# Patient Record
Sex: FEMALE | Race: WHITE | NOT HISPANIC OR LATINO | Employment: FULL TIME | URBAN - METROPOLITAN AREA
[De-identification: names, ages, dates, MRNs, and addresses within clinical notes are randomized per-mention and may not be internally consistent; named-entity substitution may affect disease eponyms.]

---

## 2019-04-26 ENCOUNTER — TRANSCRIBE ORDERS (OUTPATIENT)
Dept: ADMINISTRATIVE | Facility: HOSPITAL | Age: 49
End: 2019-04-26

## 2019-04-26 ENCOUNTER — APPOINTMENT (OUTPATIENT)
Dept: LAB | Facility: IMAGING CENTER | Age: 49
End: 2019-04-26
Payer: COMMERCIAL

## 2019-04-26 DIAGNOSIS — Z00.01 ENCOUNTER FOR GENERAL ADULT MEDICAL EXAMINATION WITH ABNORMAL FINDINGS: ICD-10-CM

## 2019-04-26 DIAGNOSIS — Z00.01 ENCOUNTER FOR GENERAL ADULT MEDICAL EXAMINATION WITH ABNORMAL FINDINGS: Primary | ICD-10-CM

## 2019-04-26 LAB
25(OH)D3 SERPL-MCNC: 28.3 NG/ML (ref 30–100)
ALBUMIN SERPL BCP-MCNC: 3.8 G/DL (ref 3.5–5)
ALP SERPL-CCNC: 82 U/L (ref 46–116)
ALT SERPL W P-5'-P-CCNC: 22 U/L (ref 12–78)
ANION GAP SERPL CALCULATED.3IONS-SCNC: 5 MMOL/L (ref 4–13)
AST SERPL W P-5'-P-CCNC: 14 U/L (ref 5–45)
BASOPHILS # BLD AUTO: 0.05 THOUSANDS/ΜL (ref 0–0.1)
BASOPHILS NFR BLD AUTO: 1 % (ref 0–1)
BILIRUB SERPL-MCNC: 0.44 MG/DL (ref 0.2–1)
BUN SERPL-MCNC: 10 MG/DL (ref 5–25)
CALCIUM SERPL-MCNC: 8.9 MG/DL (ref 8.3–10.1)
CHLORIDE SERPL-SCNC: 106 MMOL/L (ref 100–108)
CHOLEST SERPL-MCNC: 127 MG/DL (ref 50–200)
CO2 SERPL-SCNC: 27 MMOL/L (ref 21–32)
CREAT SERPL-MCNC: 0.76 MG/DL (ref 0.6–1.3)
EOSINOPHIL # BLD AUTO: 0.17 THOUSAND/ΜL (ref 0–0.61)
EOSINOPHIL NFR BLD AUTO: 2 % (ref 0–6)
ERYTHROCYTE [DISTWIDTH] IN BLOOD BY AUTOMATED COUNT: 12.1 % (ref 11.6–15.1)
GFR SERPL CREATININE-BSD FRML MDRD: 92 ML/MIN/1.73SQ M
GLUCOSE P FAST SERPL-MCNC: 63 MG/DL (ref 65–99)
HCT VFR BLD AUTO: 42.5 % (ref 34.8–46.1)
HDLC SERPL-MCNC: 45 MG/DL (ref 40–60)
HGB BLD-MCNC: 13.8 G/DL (ref 11.5–15.4)
IMM GRANULOCYTES # BLD AUTO: 0.02 THOUSAND/UL (ref 0–0.2)
IMM GRANULOCYTES NFR BLD AUTO: 0 % (ref 0–2)
LDLC SERPL CALC-MCNC: 58 MG/DL (ref 0–100)
LYMPHOCYTES # BLD AUTO: 1.36 THOUSANDS/ΜL (ref 0.6–4.47)
LYMPHOCYTES NFR BLD AUTO: 20 % (ref 14–44)
MCH RBC QN AUTO: 30.2 PG (ref 26.8–34.3)
MCHC RBC AUTO-ENTMCNC: 32.5 G/DL (ref 31.4–37.4)
MCV RBC AUTO: 93 FL (ref 82–98)
MONOCYTES # BLD AUTO: 0.45 THOUSAND/ΜL (ref 0.17–1.22)
MONOCYTES NFR BLD AUTO: 6 % (ref 4–12)
NEUTROPHILS # BLD AUTO: 4.93 THOUSANDS/ΜL (ref 1.85–7.62)
NEUTS SEG NFR BLD AUTO: 71 % (ref 43–75)
NONHDLC SERPL-MCNC: 82 MG/DL
NRBC BLD AUTO-RTO: 0 /100 WBCS
PLATELET # BLD AUTO: 311 THOUSANDS/UL (ref 149–390)
PMV BLD AUTO: 11.1 FL (ref 8.9–12.7)
POTASSIUM SERPL-SCNC: 4.3 MMOL/L (ref 3.5–5.3)
PROT SERPL-MCNC: 7.3 G/DL (ref 6.4–8.2)
RBC # BLD AUTO: 4.57 MILLION/UL (ref 3.81–5.12)
SODIUM SERPL-SCNC: 138 MMOL/L (ref 136–145)
TRIGL SERPL-MCNC: 119 MG/DL
TSH SERPL DL<=0.05 MIU/L-ACNC: 0.83 UIU/ML (ref 0.36–3.74)
WBC # BLD AUTO: 6.98 THOUSAND/UL (ref 4.31–10.16)

## 2019-04-26 PROCEDURE — 84443 ASSAY THYROID STIM HORMONE: CPT

## 2019-04-26 PROCEDURE — 82306 VITAMIN D 25 HYDROXY: CPT

## 2019-04-26 PROCEDURE — 85025 COMPLETE CBC W/AUTO DIFF WBC: CPT

## 2019-04-26 PROCEDURE — 36415 COLL VENOUS BLD VENIPUNCTURE: CPT

## 2019-04-26 PROCEDURE — 80061 LIPID PANEL: CPT

## 2019-04-26 PROCEDURE — 80053 COMPREHEN METABOLIC PANEL: CPT

## 2020-05-27 ENCOUNTER — OFFICE VISIT (OUTPATIENT)
Dept: URGENT CARE | Age: 50
End: 2020-05-27
Payer: COMMERCIAL

## 2020-05-27 ENCOUNTER — TRANSCRIBE ORDERS (OUTPATIENT)
Dept: URGENT CARE | Age: 50
End: 2020-05-27

## 2020-05-27 ENCOUNTER — APPOINTMENT (OUTPATIENT)
Dept: RADIOLOGY | Age: 50
End: 2020-05-27
Payer: COMMERCIAL

## 2020-05-27 VITALS
RESPIRATION RATE: 18 BRPM | SYSTOLIC BLOOD PRESSURE: 119 MMHG | OXYGEN SATURATION: 98 % | HEIGHT: 60 IN | HEART RATE: 76 BPM | TEMPERATURE: 98.6 F | BODY MASS INDEX: 39.89 KG/M2 | WEIGHT: 203.2 LBS | DIASTOLIC BLOOD PRESSURE: 83 MMHG

## 2020-05-27 DIAGNOSIS — S60.222A CONTUSION OF LEFT HAND, INITIAL ENCOUNTER: Primary | ICD-10-CM

## 2020-05-27 DIAGNOSIS — M79.642 PAIN OF LEFT HAND: Primary | ICD-10-CM

## 2020-05-27 DIAGNOSIS — M79.642 PAIN OF LEFT HAND: ICD-10-CM

## 2020-05-27 PROCEDURE — 73130 X-RAY EXAM OF HAND: CPT

## 2020-05-27 PROCEDURE — 99213 OFFICE O/P EST LOW 20 MIN: CPT | Performed by: NURSE PRACTITIONER

## 2020-05-27 RX ORDER — MONTELUKAST SODIUM 10 MG/1
10 TABLET ORAL
COMMUNITY

## 2020-05-27 RX ORDER — LORATADINE 10 MG/1
10 TABLET ORAL DAILY
COMMUNITY

## 2021-04-03 ENCOUNTER — OFFICE VISIT (OUTPATIENT)
Dept: URGENT CARE | Age: 51
End: 2021-04-03
Payer: COMMERCIAL

## 2021-04-03 VITALS
WEIGHT: 193 LBS | DIASTOLIC BLOOD PRESSURE: 80 MMHG | TEMPERATURE: 96.5 F | RESPIRATION RATE: 18 BRPM | HEIGHT: 60 IN | SYSTOLIC BLOOD PRESSURE: 104 MMHG | BODY MASS INDEX: 37.89 KG/M2 | OXYGEN SATURATION: 98 % | HEART RATE: 89 BPM

## 2021-04-03 DIAGNOSIS — R05.9 COUGH: Primary | ICD-10-CM

## 2021-04-03 PROCEDURE — 99213 OFFICE O/P EST LOW 20 MIN: CPT | Performed by: NURSE PRACTITIONER

## 2021-04-03 PROCEDURE — 87635 SARS-COV-2 COVID-19 AMP PRB: CPT | Performed by: NURSE PRACTITIONER

## 2021-04-03 NOTE — PATIENT INSTRUCTIONS
Patient Instructions     COVID testing initiated  Results may take up to 5-10 days to return, but often come back sooner (2-4 days)     If the patient has a [facility] My Chart account, results may be accessed on line  If the patient does not have the [facility] Chart account, please establish one so results can be accessed  This will be the easiest and quickest way to get a copy of your test results if you require printed documentation  If patient is symptomatic and until results are obtained, home quarantine / self isolation strongly encouraged  If testing is done for screening purposes and patient is not symptomatic, we still recommend masking, social distancing, good hygiene practices be followed  If COVID test is positive, patient / care giver will be contacted by ordering provider or designated staff  If COVID test is positive, please call the primary care provider office to inform of positive test and request follow up evaluation appointment  (Generally, primary care providers are doing telemedicine visits with their positive COVID patients )  If COVID test is positive, please again review all information below  Further questions may be addressed by the primary care provider or the [address] [place] [address] [employer] at [phone]  If the patient / caregiver has not heard about test results or has been unable to access results on the patient My Chart account in a timely fashion, please call the provider's office where test was ordered (or Hot Line if applicable)  to inquire about results  If results are negative and patient / care giver has been found to have already accessed results through the [facility], no call will be made  Until results are obtained, home quarantine / self isolation strongly encouraged       If the patient would develop profound weakness, chest pain, shortness of breath please proceed to an emergency room for further evaluation otherwise we do recommend that patient follow-up with their primary care provider in the next 5-7 days if not improving  Symptomatic treatment as needed for symptoms relief based on age / medical status of patient  Things like warm salt water gargles, Tylenol or Ibuprofen (if not contraindicated), drinking plenty of fluids, nasal saline rinses / spray, warm tea with honey (not for patients less than 1 year of age),  etc may provide symptoms relief  [address]    Your healthcare provider and/or public health staff have evaluated you and have determined that you do not need to remain in the hospital at this time  At this time you can be isolated at home where you will be monitored by staff from your local or state health department  You should carefully follow the prevention and isolation steps below until a healthcare provider or local or state health department says that you can return to your normal activities  Stay home except to get medical care    People who are mildly ill with COVID-19 are able to isolate at home during their illness  You should restrict activities outside your home, except for getting medical care  Do not go to work, school, or public areas  Avoid using public transportation, ride-sharing, or taxis  Separate yourself from other people and animals in your home    People: As much as possible, you should stay in a specific room and away from other people in your home  Also, you should use a separate bathroom, if available  Animals: You should restrict contact with pets and other animals while you are sick with COVID-19, just like you would around other people  Although there have not been reports of pets or other animals becoming sick with COVID-19, it is still recommended that people sick with COVID-19 limit contact with animals until more information is known about the virus  When possible, have another member of your household care for your animals while you are sick   If you are sick with COVID-19, avoid contact with your pet, including petting, snuggling, being kissed or licked, and sharing food  If you must care for your pet or be around animals while you are sick, wash your hands before and after you interact with pets and wear a facemask  See COVID-19 and Animals for more information  Call ahead before visiting your doctor    If you have a medical appointment, call the healthcare provider and tell them that you have or may have COVID-19  This will help the healthcare providers office take steps to keep other people from getting infected or exposed  Wear a facemask    You should wear a facemask when you are around other people (e g , sharing a room or vehicle) or pets and before you enter a healthcare providers office  If you are not able to wear a facemask (for example, because it causes trouble breathing), then people who live with you should not stay in the same room with you, or they should wear a facemask if they enter your room  Cover your coughs and sneezes    Cover your mouth and nose with a tissue when you cough or sneeze  Throw used tissues in a lined trash can  Immediately wash your hands with soap and water for at least 20 seconds or, if soap and water are not available, clean your hands with an alcohol-based hand  that contains at least 60% alcohol  Clean your hands often    Wash your hands often with soap and water for at least 20 seconds, especially after blowing your nose, coughing, or sneezing; going to the bathroom; and before eating or preparing food  If soap and water are not readily available, use an alcohol-based hand  with at least 60% alcohol, covering all surfaces of your hands and rubbing them together until they feel dry  Soap and water are the best option if hands are visibly dirty  Avoid touching your eyes, nose, and mouth with unwashed hands      Avoid sharing personal household items    You should not share dishes, drinking glasses, cups, eating utensils, towels, or bedding with other people or pets in your home  After using these items, they should be washed thoroughly with soap and water  Clean all high-touch surfaces everyday    High touch surfaces include counters, tabletops, doorknobs, bathroom fixtures, toilets, phones, keyboards, tablets, and bedside tables  Also, clean any surfaces that may have blood, stool, or body fluids on them  Use a household cleaning spray or wipe, according to the label instructions  Labels contain instructions for safe and effective use of the cleaning product including precautions you should take when applying the product, such as wearing gloves and making sure you have good ventilation during use of the product  Monitor your symptoms    Seek prompt medical attention if your illness is worsening (e g , difficulty breathing)  Before seeking care, call your healthcare provider and tell them that you have, or are being evaluated for, COVID-19  Put on a facemask before you enter the facility  These steps will help the healthcare providers office to keep other people in the office or waiting room from getting infected or exposed  Ask your healthcare provider to call the local or [facility] health department  Persons who are placed under active monitoring or facilitated self-monitoring should follow instructions provided by their local health department or occupational health professionals, as appropriate  If you have a medical emergency and need to call 911, notify the dispatch personnel that you have, or are being evaluated for COVID-19  If possible, put on a facemask before emergency medical services arrive      Discontinuing home isolation    Patients with confirmed COVID-19 should remain under home isolation precautions until the following conditions are met:   - They have had no fever for at least 24 hours (that is one full day of no fever without the use medicine that reduces fevers)  AND  - other symptoms have improved (for example, when their cough or shortness of breath have improved)  AND  - If had mild or moderate illness, at least 10 days have passed since their symptoms first appeared or if severe illness (needed oxygen) or immunosuppressed, at least 20 days have passed since symptoms first appeared  Patients with confirmed COVID-19 should also notify close contacts (including their workplace) and ask that they self-quarantine  Currently, close contact is defined as being within 6 feet for 15 minutes or more from the period 24 hours starting 48 hours before symptom onset to the time at which the patient went into isolation  Close contacts of patients diagnosed with COVID-19 should be instructed by the patient to self-quarantine for 14 days from the last time of their last contact with the patient       Source: [employer]

## 2021-04-03 NOTE — PROGRESS NOTES
[facility]        NAME: Candis Angel is a 44 y o  female  : 1970    MRN: [id number]  DATE: April 3, 2021  TIME: 3:46 PM    Assessment and Plan   Cough [R05]  1  Cough  Novel Coronavirus (Covid-19),PCR [facility] - Office Collection     covid swab done   Patient Instructions     COVID testing initiated  Results may take up to 5-10 days to return, but often come back sooner (2-4 days)     If the patient has a [facility] My Chart account, results may be accessed on line  If the patient does not have the [facility] Chart account, please establish one so results can be accessed  This will be the easiest and quickest way to get a copy of your test results if you require printed documentation  If patient is symptomatic and until results are obtained, home quarantine / self isolation strongly encouraged  If testing is done for screening purposes and patient is not symptomatic, we still recommend masking, social distancing, good hygiene practices be followed  If COVID test is positive, patient / care giver will be contacted by ordering provider or designated staff  If COVID test is positive, please call the primary care provider office to inform of positive test and request follow up evaluation appointment  (Generally, primary care providers are doing telemedicine visits with their positive COVID patients )  If COVID test is positive, please again review all information below  Further questions may be addressed by the primary care provider or the [address] [place] [address] [employer] at [phone]  If the patient / caregiver has not heard about test results or has been unable to access results on the patient My Chart account in a timely fashion, please call the provider's office where test was ordered (or Hot Line if applicable)  to inquire about results         If results are negative and patient / care giver has been found to have already accessed results through the [facility] g[facility], no call will be made  Until results are obtained, home quarantine / self isolation strongly encouraged  If the patient would develop profound weakness, chest pain, shortness of breath please proceed to an emergency room for further evaluation otherwise we do recommend that patient follow-up with their primary care provider in the next 5-7 days if not improving  Symptomatic treatment as needed for symptoms relief based on age / medical status of patient  Things like warm salt water gargles, Tylenol or Ibuprofen (if not contraindicated), drinking plenty of fluids, nasal saline rinses / spray, warm tea with honey (not for patients less than 1 year of age),  etc may provide symptoms relief  Patient Instructions     Follow up with PCP in 3-5 days  Proceed to  ER if symptoms worsen  Chief Complaint     Chief Complaint   Patient presents with    Headache     Patient c/o congestion, sore throat, headache, and cough x 5 day  Patient reports that a co-worker/ manager  tested positive for covid-19  History of Present Illness   David Epifanioit presents to the clinic c/o    Patient c/o congestion, sore throat, headache, and cough x 3 day  Patient reports that a co-worker/ manager tested positive for covid-19  Headache is the most bothersome symptoms   Taking ibuprofen   Has been taking amoxicillin also - no improvement - thought it was a sinus infection initially  Now that she isn't getting better she would like a covid swab  Has np appt with pcp scheduled in 2 weeks  Review of Systems   Review of Systems   All other systems reviewed and are negative          Current Medications     Long-Term Medications   Medication Sig Dispense Refill    hyoscyamine (LEVSIN/SL) 0 125 mg SL tablet Take 0 125 mg by mouth every 4 (four) hours as needed for cramping      loratadine (CLARITIN) 10 mg tablet Take 10 mg by mouth daily      montelukast (SINGULAIR) 10 mg tablet Take 10 mg by mouth daily at bedtime         Current Allergies     Allergies as of 04/03/2021 - Reviewed 04/03/2021   Allergen Reaction Noted    Codeine GI Intolerance and Other (See Comments) 07/14/2014    Hydrocodone-acetaminophen Other (See Comments) and Headache 11/05/2015            The following portions of the patient's history were reviewed and updated as appropriate: allergies, current medications, past family history, past medical history, past social history, past surgical history and problem list     Objective   /80   Pulse 89   Temp (!) 96 5 °F (35 8 °C)   Resp 18   Ht 5' (1 524 m)   Wt 87 5 kg (193 lb)   SpO2 98%   BMI 37 69 kg/m²        Physical Exam     Physical Exam  Vitals signs and nursing note reviewed  Constitutional:       Appearance: She is well-developed  HENT:      Head: Normocephalic and atraumatic  Eyes:      General: Lids are normal       Conjunctiva/sclera: Conjunctivae normal    Cardiovascular:      Rate and Rhythm: Normal rate and regular rhythm  Heart sounds: Normal heart sounds, S1 normal and S2 normal    Pulmonary:      Effort: Pulmonary effort is normal       Breath sounds: Normal breath sounds  Skin:     General: Skin is warm and dry  Neurological:      Mental Status: She is alert and oriented to person, place, and time  Psychiatric:         Speech: Speech normal          Behavior: Behavior normal  Behavior is cooperative  Thought Content:  Thought content normal          Judgment: Judgment normal

## 2021-04-04 LAB — SARS-COV-2 RNA RESP QL NAA+PROBE: NEGATIVE

## 2021-04-11 ENCOUNTER — OFFICE VISIT (OUTPATIENT)
Dept: URGENT CARE | Age: 51
End: 2021-04-11
Payer: COMMERCIAL

## 2021-04-11 VITALS
BODY MASS INDEX: 37.89 KG/M2 | SYSTOLIC BLOOD PRESSURE: 119 MMHG | TEMPERATURE: 98.2 F | RESPIRATION RATE: 18 BRPM | DIASTOLIC BLOOD PRESSURE: 86 MMHG | WEIGHT: 193 LBS | OXYGEN SATURATION: 99 % | HEART RATE: 94 BPM | HEIGHT: 60 IN

## 2021-04-11 DIAGNOSIS — N30.01 ACUTE CYSTITIS WITH HEMATURIA: Primary | ICD-10-CM

## 2021-04-11 LAB
SL AMB  POCT GLUCOSE, UA: NEGATIVE
SL AMB LEUKOCYTE ESTERASE,UA: ABNORMAL
SL AMB POCT BILIRUBIN,UA: NEGATIVE
SL AMB POCT BLOOD,UA: ABNORMAL
SL AMB POCT CLARITY,UA: ABNORMAL
SL AMB POCT COLOR,UA: ABNORMAL
SL AMB POCT KETONES,UA: NEGATIVE
SL AMB POCT NITRITE,UA: NEGATIVE
SL AMB POCT PH,UA: 5
SL AMB POCT SPECIFIC GRAVITY,UA: 1
SL AMB POCT URINE PROTEIN: ABNORMAL
SL AMB POCT UROBILINOGEN: 0.2

## 2021-04-11 PROCEDURE — 87186 SC STD MICRODIL/AGAR DIL: CPT | Performed by: PHYSICIAN ASSISTANT

## 2021-04-11 PROCEDURE — 87077 CULTURE AEROBIC IDENTIFY: CPT | Performed by: PHYSICIAN ASSISTANT

## 2021-04-11 PROCEDURE — 87086 URINE CULTURE/COLONY COUNT: CPT | Performed by: PHYSICIAN ASSISTANT

## 2021-04-11 PROCEDURE — 81002 URINALYSIS NONAUTO W/O SCOPE: CPT | Performed by: PHYSICIAN ASSISTANT

## 2021-04-11 PROCEDURE — 99213 OFFICE O/P EST LOW 20 MIN: CPT | Performed by: PHYSICIAN ASSISTANT

## 2021-04-11 RX ORDER — PHENAZOPYRIDINE HYDROCHLORIDE 200 MG/1
200 TABLET, FILM COATED ORAL
Qty: 10 TABLET | Refills: 0 | Status: SHIPPED | OUTPATIENT
Start: 2021-04-11

## 2021-04-11 RX ORDER — CEPHALEXIN 500 MG/1
500 CAPSULE ORAL EVERY 12 HOURS SCHEDULED
Qty: 14 CAPSULE | Refills: 0 | Status: SHIPPED | OUTPATIENT
Start: 2021-04-11 | End: 2021-04-11

## 2021-04-11 RX ORDER — CEPHALEXIN 500 MG/1
500 CAPSULE ORAL EVERY 12 HOURS SCHEDULED
Qty: 14 CAPSULE | Refills: 0 | Status: SHIPPED | OUTPATIENT
Start: 2021-04-11 | End: 2021-04-18

## 2021-04-11 NOTE — PROGRESS NOTES
[facility]        NAME: Robert Mccormack is a 44 y o  female  : 1970    MRN: [id number]  DATE: 2021  TIME: 7:51 PM    Assessment and Plan   Acute cystitis with hematuria [N30 01]  1  Acute cystitis with hematuria  POCT urine dip    Urine culture    cephalexin (KEFLEX) 500 mg capsule         Patient Instructions       Follow up with PCP in 3-5 days  Proceed to  ER if symptoms worsen  Chief Complaint     Chief Complaint   Patient presents with    Possible UTI     pt reports urinary frequency and urinary burning and blood when wiping that started yesterday         History of Present Illness       Patient is c/o urinary frequency and dysuria  Pt reports symptoms began yesterday  Burning w/ urination  Also hematuria w/ wiping  Pt denies fever  Urinary Tract Infection   This is a new problem  The current episode started yesterday  The problem has been gradually worsening  The pain is mild  There has been no fever  Associated symptoms include frequency  Pertinent negatives include no chills, hematuria or vomiting  Review of Systems   Review of Systems   Constitutional: Negative for chills and fever  HENT: Negative for ear pain and sore throat  Eyes: Negative for pain and visual disturbance  Respiratory: Negative for cough and shortness of breath  Cardiovascular: Negative for chest pain and palpitations  Gastrointestinal: Negative for abdominal pain and vomiting  Genitourinary: Positive for dysuria and frequency  Negative for hematuria  Musculoskeletal: Negative for arthralgias and back pain  Skin: Negative for color change and rash  Neurological: Negative for seizures and syncope  All other systems reviewed and are negative          Current Medications       Current Outpatient Medications:     hyoscyamine (LEVSIN/SL) 0 125 mg SL tablet, Take 0 125 mg by mouth every 4 (four) hours as needed for cramping, Disp: , Rfl:     loratadine (CLARITIN) 10 mg tablet, Take 10 mg by mouth daily, Disp: , Rfl:     montelukast (SINGULAIR) 10 mg tablet, Take 10 mg by mouth daily at bedtime, Disp: , Rfl:     cephalexin (KEFLEX) 500 mg capsule, Take 1 capsule (500 mg total) by mouth every 12 (twelve) hours for 7 days, Disp: 14 capsule, Rfl: 0    Current Allergies     Allergies as of 04/11/2021 - Reviewed 04/11/2021   Allergen Reaction Noted    Codeine GI Intolerance and Other (See Comments) 07/14/2014    Hydrocodone-acetaminophen Other (See Comments) and Headache 11/05/2015            The following portions of the patient's history were reviewed and updated as appropriate: allergies, current medications, past family history, past medical history, past social history, past surgical history and problem list      Past Medical History:   Diagnosis Date    GI symptoms 05/27/2020    Lactose intolerance        Past Surgical History:   Procedure Laterality Date    OVARIAN CYST REMOVAL Left        History reviewed  No pertinent family history  Medications have been verified  Objective   /86   Pulse 94   Temp 98 2 °F (36 8 °C)   Resp 18   Ht 5' (1 524 m)   Wt 87 5 kg (193 lb)   SpO2 99%   BMI 37 69 kg/m²   No LMP recorded  Physical Exam     Physical Exam  Constitutional:       Appearance: Normal appearance  HENT:      Head: Normocephalic and atraumatic  Nose: Nose normal       Mouth/Throat:      Mouth: Mucous membranes are moist    Eyes:      Extraocular Movements: Extraocular movements intact  Conjunctiva/sclera: Conjunctivae normal       Pupils: Pupils are equal, round, and reactive to light  Neck:      Musculoskeletal: Normal range of motion and neck supple  Cardiovascular:      Rate and Rhythm: Normal rate  Pulmonary:      Effort: Pulmonary effort is normal    Musculoskeletal: Normal range of motion  Skin:     General: Skin is warm and dry  Capillary Refill: Capillary refill takes less than 2 seconds     Neurological:      General: No focal deficit present  Mental Status: She is alert and oriented to person, place, and time     Psychiatric:         Mood and Affect: Mood normal          Behavior: Behavior normal

## 2021-04-11 NOTE — PATIENT INSTRUCTIONS
Urinary Tract Infection in Women   WHAT YOU NEED TO KNOW:   A urinary tract infection (UTI) is caused by bacteria that get inside your urinary tract  Most bacteria that enter your urinary tract come out when you urinate  If the bacteria stay in your urinary tract, you may get an infection  Your urinary tract includes your kidneys, ureters, bladder, and urethra  Urine is made in your kidneys, and it flows from the ureters to the bladder  Urine leaves the bladder through the urethra  A UTI is more common in your lower urinary tract, which includes your bladder and urethra  DISCHARGE INSTRUCTIONS:   Return to the emergency department if:   · You are urinating very little or not at all  · You have a high fever with shaking chills  · You have side or back pain that gets worse  Call your doctor if:   · You have a fever  · You do not feel better after 2 days of taking antibiotics  · You are vomiting  · You have questions or concerns about your condition or care  Medicines:   · Antibiotics  help fight a bacterial infection  If you have UTIs often (called recurrent UTIs), you may be given antibiotics to take regularly  You will be given directions for when and how to use antibiotics  The goal is to prevent UTIs but not cause antibiotic resistance by using antibiotics too often  · Medicines  may be given to decrease pain and burning when you urinate  They will also help decrease the feeling that you need to urinate often  These medicines will make your urine orange or red  · Take your medicine as directed  Contact your healthcare provider if you think your medicine is not helping or if you have side effects  Tell him or her if you are allergic to any medicine  Keep a list of the medicines, vitamins, and herbs you take  Include the amounts, and when and why you take them  Bring the list or the pill bottles to follow-up visits   Carry your medicine list with you in case of an emergency  Follow up with your healthcare provider as directed:  Write down your questions so you remember to ask them during your visits  Prevent another UTI:   · Empty your bladder often  Urinate and empty your bladder as soon as you feel the need  Do not hold your urine for long periods of time  · Wipe from front to back after you urinate or have a bowel movement  This will help prevent germs from getting into your urinary tract through your urethra  · Drink liquids as directed  Ask how much liquid to drink each day and which liquids are best for you  You may need to drink more liquids than usual to help flush out the bacteria  Do not drink alcohol, caffeine, or citrus juices  These can irritate your bladder and increase your symptoms  Your healthcare provider may recommend cranberry juice to help prevent a UTI  · Urinate after you have sex  This can help flush out bacteria passed during sex  · Do not douche or use feminine deodorants  These can change the chemical balance in your vagina  · Change sanitary pads or tampons often  This will help prevent germs from getting into your urinary tract  · Talk to your healthcare provider about your birth control method  You may need to change your method if it is increasing your risk for UTIs  · Wear cotton underwear and clothes that are loose  Tight pants and nylon underwear can trap moisture and cause bacteria to grow  · Vaginal estrogen may be recommended  This medicine helps prevent UTIs in women who have gone through menopause or are in maritza-menopause  · Do pelvic muscle exercises often  Pelvic muscle exercises may help you start and stop urinating  Strong pelvic muscles may help you empty your bladder easier  Squeeze these muscles tightly for 5 seconds like you are trying to hold back urine  Then relax for 5 seconds  Gradually work up to squeezing for 10 seconds  Do 3 sets of 15 repetitions a day, or as directed      © Copyright [address] Information is for [employer] use only and may not be sold, redistributed or otherwise used for commercial purposes  All illustrations and images included in CareNotes® are the copyrighted property of [employer]  or [employer]  The above information is an  only  It is not intended as medical advice for individual conditions or treatments  Talk to your doctor, nurse or pharmacist before following any medical regimen to see if it is safe and effective for you

## 2021-04-13 LAB — BACTERIA UR CULT: ABNORMAL

## 2022-05-13 ENCOUNTER — OFFICE VISIT (OUTPATIENT)
Dept: FAMILY MEDICINE CLINIC | Facility: CLINIC | Age: 52
End: 2022-05-13
Payer: COMMERCIAL

## 2022-05-13 VITALS
SYSTOLIC BLOOD PRESSURE: 134 MMHG | BODY MASS INDEX: 35.14 KG/M2 | RESPIRATION RATE: 16 BRPM | HEART RATE: 81 BPM | DIASTOLIC BLOOD PRESSURE: 86 MMHG | OXYGEN SATURATION: 98 % | WEIGHT: 179 LBS | TEMPERATURE: 97.3 F | HEIGHT: 60 IN

## 2022-05-13 DIAGNOSIS — R11.0 NAUSEA: ICD-10-CM

## 2022-05-13 DIAGNOSIS — J45.20 MILD INTERMITTENT ASTHMA WITHOUT COMPLICATION: ICD-10-CM

## 2022-05-13 DIAGNOSIS — F33.9 DEPRESSION, RECURRENT (HCC): ICD-10-CM

## 2022-05-13 DIAGNOSIS — F41.9 ANXIETY: ICD-10-CM

## 2022-05-13 DIAGNOSIS — Z00.00 HEALTHCARE MAINTENANCE: ICD-10-CM

## 2022-05-13 DIAGNOSIS — Z78.0 ASYMPTOMATIC MENOPAUSE: ICD-10-CM

## 2022-05-13 DIAGNOSIS — Z12.31 ENCOUNTER FOR SCREENING MAMMOGRAM FOR MALIGNANT NEOPLASM OF BREAST: Primary | ICD-10-CM

## 2022-05-13 PROBLEM — M48.061 SPINAL STENOSIS OF LUMBAR REGION: Status: ACTIVE | Noted: 2022-05-13

## 2022-05-13 PROBLEM — N39.3 SUI (STRESS URINARY INCONTINENCE, FEMALE): Status: ACTIVE | Noted: 2018-01-27

## 2022-05-13 PROBLEM — M25.519 SHOULDER PAIN: Status: ACTIVE | Noted: 2022-05-13

## 2022-05-13 PROBLEM — M54.2 NECK PAIN: Status: ACTIVE | Noted: 2022-05-13

## 2022-05-13 PROCEDURE — 3725F SCREEN DEPRESSION PERFORMED: CPT | Performed by: FAMILY MEDICINE

## 2022-05-13 PROCEDURE — 99386 PREV VISIT NEW AGE 40-64: CPT | Performed by: FAMILY MEDICINE

## 2022-05-13 RX ORDER — ONDANSETRON 4 MG/1
4 TABLET, ORALLY DISINTEGRATING ORAL EVERY 6 HOURS PRN
Qty: 30 TABLET | Refills: 0 | Status: SHIPPED | OUTPATIENT
Start: 2022-05-13

## 2022-05-13 RX ORDER — LORAZEPAM 0.5 MG/1
TABLET ORAL
COMMUNITY
Start: 2022-05-05 | End: 2022-05-24 | Stop reason: SDUPTHER

## 2022-05-13 RX ORDER — CYCLOBENZAPRINE HCL 10 MG
TABLET ORAL
COMMUNITY

## 2022-05-13 RX ORDER — BUPROPION HYDROCHLORIDE 150 MG/1
150 TABLET ORAL EVERY MORNING
Qty: 30 TABLET | Refills: 5 | Status: SHIPPED | OUTPATIENT
Start: 2022-05-13 | End: 2022-05-24

## 2022-05-13 RX ORDER — ONDANSETRON 4 MG/1
4 TABLET, FILM COATED ORAL DAILY
COMMUNITY
Start: 2022-05-05 | End: 2022-05-24

## 2022-05-13 RX ORDER — ALBUTEROL SULFATE 90 UG/1
AEROSOL, METERED RESPIRATORY (INHALATION)
COMMUNITY

## 2022-05-13 NOTE — PROGRESS NOTES
Assessment/Plan:    Anxiety  Wellbutrin sent to pharmacy  Will continue to monitor in one month  Asymptomatic menopause  DXA scan ordered  Will continue to monitor  Encounter for screening mammogram for malignant neoplasm of breast  Mammogram ordered  Will continue to monitor     Healthcare maintenance  Routine labs ordered  Will continue to monitor at next follow up visit in one month  Problem List Items Addressed This Visit        Respiratory    Asthma    Relevant Medications    albuterol (PROVENTIL HFA,VENTOLIN HFA) 90 mcg/act inhaler       Other    Depression, recurrent (HCC)    Relevant Medications    LORazepam (ATIVAN) 0 5 mg tablet    buPROPion (Wellbutrin XL) 150 mg 24 hr tablet    Encounter for screening mammogram for malignant neoplasm of breast - Primary     Mammogram ordered  Will continue to monitor            Relevant Orders    Mammo screening bilateral w 3d & cad    Healthcare maintenance     Routine labs ordered  Will continue to monitor at next follow up visit in one month  Relevant Orders    CBC and differential    Comprehensive metabolic panel    Lipid Panel with Direct LDL reflex    TSH, 3rd generation with Free T4 reflex    Anxiety     Wellbutrin sent to pharmacy  Will continue to monitor in one month  Relevant Medications    buPROPion (Wellbutrin XL) 150 mg 24 hr tablet    Nausea    Relevant Medications    ondansetron (Zofran ODT) 4 mg disintegrating tablet    Asymptomatic menopause     DXA scan ordered  Will continue to monitor  Relevant Orders    DXA bone density spine hip and pelvis      Other Visit Diagnoses     BMI 34 0-34 9,adult                Subjective:      Patient ID: Argelia Allred is a 46 y o  female  MR is a 46 yof presenting to the office today to establish new patient care  She has concerns about ongoing anxiety attacks that have been occurring for the past few months   She states that she often feels anxious and nauseous when these attacks happen  She has used 0 5 mg of ativan before bed with some relief  She used to be on Wellbutrin a while ago which helped her symptoms in the past  She is also starting a weight loss program soon to help with weight management  She is taking all other medications as prescribed with no side effects  The following portions of the patient's history were reviewed and updated as appropriate: allergies, current medications, past family history, past medical history, past social history, past surgical history and problem list     Review of Systems   Constitutional: Negative for chills and fever  HENT: Negative for ear pain and sore throat  Eyes: Negative for pain and visual disturbance  Respiratory: Negative for cough and shortness of breath  Cardiovascular: Negative for chest pain and palpitations  Gastrointestinal: Negative for abdominal pain and vomiting  Genitourinary: Negative for dysuria and hematuria  Musculoskeletal: Negative for arthralgias and back pain  Skin: Negative for color change and rash  Neurological: Negative for seizures and syncope  Psychiatric/Behavioral: The patient is nervous/anxious  All other systems reviewed and are negative  Objective:      /86 (BP Location: Left arm, Patient Position: Sitting, Cuff Size: Large)   Pulse 81   Temp (!) 97 3 °F (36 3 °C) (Tympanic)   Resp 16   Ht 5' (1 524 m)   Wt 81 2 kg (179 lb)   SpO2 98%   BMI 34 96 kg/m²          Physical Exam  Vitals and nursing note reviewed  Constitutional:       Appearance: Normal appearance  She is well-developed  HENT:      Head: Normocephalic and atraumatic  Right Ear: Tympanic membrane normal       Left Ear: Tympanic membrane normal       Nose: Nose normal       Mouth/Throat:      Mouth: Mucous membranes are moist    Eyes:      Conjunctiva/sclera: Conjunctivae normal       Pupils: Pupils are equal, round, and reactive to light     Cardiovascular: Rate and Rhythm: Normal rate and regular rhythm  Pulses: Normal pulses  Heart sounds: Normal heart sounds  Pulmonary:      Effort: Pulmonary effort is normal       Breath sounds: Normal breath sounds  Abdominal:      General: Bowel sounds are normal       Palpations: Abdomen is soft  Musculoskeletal:         General: Normal range of motion  Cervical back: Normal range of motion and neck supple  Right lower leg: No edema  Left lower leg: No edema  Lymphadenopathy:      Cervical: No cervical adenopathy  Skin:     General: Skin is warm and dry  Capillary Refill: Capillary refill takes less than 2 seconds  Neurological:      Mental Status: She is alert and oriented to person, place, and time  Cranial Nerves: No cranial nerve deficit  Psychiatric:         Mood and Affect: Mood normal          Behavior: Behavior normal          Thought Content: Thought content normal        Kerrie DEVLINSBMI Counseling: Body mass index is 34 96 kg/m²  The BMI is above normal  Nutrition recommendations include reducing portion sizes, decreasing overall calorie intake and 3-5 servings of fruits/vegetables daily  Exercise recommendations include moderate aerobic physical activity for 150 minutes/week

## 2022-05-14 ENCOUNTER — APPOINTMENT (OUTPATIENT)
Dept: LAB | Facility: IMAGING CENTER | Age: 52
End: 2022-05-14
Payer: COMMERCIAL

## 2022-05-14 DIAGNOSIS — Z00.00 HEALTHCARE MAINTENANCE: ICD-10-CM

## 2022-05-14 LAB
ALBUMIN SERPL BCP-MCNC: 3.7 G/DL (ref 3.5–5)
ALP SERPL-CCNC: 67 U/L (ref 46–116)
ALT SERPL W P-5'-P-CCNC: 20 U/L (ref 12–78)
ANION GAP SERPL CALCULATED.3IONS-SCNC: 2 MMOL/L (ref 4–13)
AST SERPL W P-5'-P-CCNC: 9 U/L (ref 5–45)
BASOPHILS # BLD AUTO: 0.05 THOUSANDS/ΜL (ref 0–0.1)
BASOPHILS NFR BLD AUTO: 1 % (ref 0–1)
BILIRUB SERPL-MCNC: 0.47 MG/DL (ref 0.2–1)
BUN SERPL-MCNC: 12 MG/DL (ref 5–25)
CALCIUM SERPL-MCNC: 9.2 MG/DL (ref 8.3–10.1)
CHLORIDE SERPL-SCNC: 109 MMOL/L (ref 100–108)
CHOLEST SERPL-MCNC: 121 MG/DL
CO2 SERPL-SCNC: 28 MMOL/L (ref 21–32)
CREAT SERPL-MCNC: 0.86 MG/DL (ref 0.6–1.3)
EOSINOPHIL # BLD AUTO: 0.09 THOUSAND/ΜL (ref 0–0.61)
EOSINOPHIL NFR BLD AUTO: 1 % (ref 0–6)
ERYTHROCYTE [DISTWIDTH] IN BLOOD BY AUTOMATED COUNT: 13.3 % (ref 11.6–15.1)
GFR SERPL CREATININE-BSD FRML MDRD: 77 ML/MIN/1.73SQ M
GLUCOSE P FAST SERPL-MCNC: 88 MG/DL (ref 65–99)
HCT VFR BLD AUTO: 40 % (ref 34.8–46.1)
HDLC SERPL-MCNC: 52 MG/DL
HGB BLD-MCNC: 13.1 G/DL (ref 11.5–15.4)
IMM GRANULOCYTES # BLD AUTO: 0.02 THOUSAND/UL (ref 0–0.2)
IMM GRANULOCYTES NFR BLD AUTO: 0 % (ref 0–2)
LDLC SERPL CALC-MCNC: 54 MG/DL (ref 0–100)
LYMPHOCYTES # BLD AUTO: 1.65 THOUSANDS/ΜL (ref 0.6–4.47)
LYMPHOCYTES NFR BLD AUTO: 24 % (ref 14–44)
MCH RBC QN AUTO: 30.5 PG (ref 26.8–34.3)
MCHC RBC AUTO-ENTMCNC: 32.8 G/DL (ref 31.4–37.4)
MCV RBC AUTO: 93 FL (ref 82–98)
MONOCYTES # BLD AUTO: 0.49 THOUSAND/ΜL (ref 0.17–1.22)
MONOCYTES NFR BLD AUTO: 7 % (ref 4–12)
NEUTROPHILS # BLD AUTO: 4.63 THOUSANDS/ΜL (ref 1.85–7.62)
NEUTS SEG NFR BLD AUTO: 67 % (ref 43–75)
NRBC BLD AUTO-RTO: 0 /100 WBCS
PLATELET # BLD AUTO: 334 THOUSANDS/UL (ref 149–390)
PMV BLD AUTO: 11.2 FL (ref 8.9–12.7)
POTASSIUM SERPL-SCNC: 3.8 MMOL/L (ref 3.5–5.3)
PROT SERPL-MCNC: 6.7 G/DL (ref 6.4–8.2)
RBC # BLD AUTO: 4.3 MILLION/UL (ref 3.81–5.12)
SODIUM SERPL-SCNC: 139 MMOL/L (ref 136–145)
TRIGL SERPL-MCNC: 75 MG/DL
TSH SERPL DL<=0.05 MIU/L-ACNC: 0.73 UIU/ML (ref 0.45–4.5)
WBC # BLD AUTO: 6.93 THOUSAND/UL (ref 4.31–10.16)

## 2022-05-14 PROCEDURE — 84443 ASSAY THYROID STIM HORMONE: CPT

## 2022-05-14 PROCEDURE — 85025 COMPLETE CBC W/AUTO DIFF WBC: CPT

## 2022-05-14 PROCEDURE — 80053 COMPREHEN METABOLIC PANEL: CPT

## 2022-05-14 PROCEDURE — 36415 COLL VENOUS BLD VENIPUNCTURE: CPT

## 2022-05-14 PROCEDURE — 80061 LIPID PANEL: CPT

## 2022-05-16 ENCOUNTER — TELEPHONE (OUTPATIENT)
Dept: FAMILY MEDICINE CLINIC | Facility: CLINIC | Age: 52
End: 2022-05-16

## 2022-05-16 NOTE — TELEPHONE ENCOUNTER
Patient called and said she was here Friday and given wellbutryn for anxiety  She now has "spasms" or involuntary movements of her hands and legs that last just a second or two  She now recalls she thinks she took this in the past with side effects  Asking what to do      Asking for a call

## 2022-05-16 NOTE — TELEPHONE ENCOUNTER
Per Dr Najma Ambrocio , d/c Wellbutrin, sched appt to discuss other options  Pt aware , agrees and sched appt

## 2022-05-24 ENCOUNTER — TELEPHONE (OUTPATIENT)
Dept: FAMILY MEDICINE CLINIC | Facility: CLINIC | Age: 52
End: 2022-05-24

## 2022-05-24 ENCOUNTER — OFFICE VISIT (OUTPATIENT)
Dept: FAMILY MEDICINE CLINIC | Facility: CLINIC | Age: 52
End: 2022-05-24
Payer: COMMERCIAL

## 2022-05-24 VITALS
HEART RATE: 108 BPM | TEMPERATURE: 97.6 F | BODY MASS INDEX: 33.57 KG/M2 | RESPIRATION RATE: 16 BRPM | DIASTOLIC BLOOD PRESSURE: 84 MMHG | OXYGEN SATURATION: 98 % | WEIGHT: 171 LBS | SYSTOLIC BLOOD PRESSURE: 110 MMHG | HEIGHT: 60 IN

## 2022-05-24 DIAGNOSIS — F41.9 ANXIETY: Primary | ICD-10-CM

## 2022-05-24 DIAGNOSIS — K21.9 GASTROESOPHAGEAL REFLUX DISEASE WITHOUT ESOPHAGITIS: ICD-10-CM

## 2022-05-24 DIAGNOSIS — Z12.11 COLON CANCER SCREENING: ICD-10-CM

## 2022-05-24 DIAGNOSIS — R11.0 NAUSEA: ICD-10-CM

## 2022-05-24 PROCEDURE — 1036F TOBACCO NON-USER: CPT | Performed by: FAMILY MEDICINE

## 2022-05-24 PROCEDURE — 99214 OFFICE O/P EST MOD 30 MIN: CPT | Performed by: FAMILY MEDICINE

## 2022-05-24 PROCEDURE — 3008F BODY MASS INDEX DOCD: CPT | Performed by: FAMILY MEDICINE

## 2022-05-24 RX ORDER — PANTOPRAZOLE SODIUM 40 MG/1
40 TABLET, DELAYED RELEASE ORAL
Qty: 30 TABLET | Refills: 1 | Status: SHIPPED | OUTPATIENT
Start: 2022-05-24 | End: 2022-06-15

## 2022-05-24 RX ORDER — LORAZEPAM 0.5 MG/1
0.5 TABLET ORAL EVERY 8 HOURS PRN
Qty: 30 TABLET | Refills: 0 | Status: SHIPPED | OUTPATIENT
Start: 2022-05-24

## 2022-05-24 RX ORDER — ESCITALOPRAM OXALATE 10 MG/1
10 TABLET ORAL DAILY
Qty: 30 TABLET | Refills: 1 | Status: SHIPPED | OUTPATIENT
Start: 2022-05-24 | End: 2022-06-06 | Stop reason: SDUPTHER

## 2022-05-24 NOTE — PROGRESS NOTES
Assessment/Plan:  Anxiety  Not well controlled  I am going to start her on Lexapro 10 mg 1 tablet daily  Discussed about possible side effects  Also she was given prescription for Ativan 0 5 mg 1 tablet 3 times a day as needed  She was told this is temporary for now until her Lexapro start working  Come back in to 3 weeks  Nausea  Was given prescription for pantoprazole  Discussed about possible side effects  Come back in 3 weeks  Gastroesophageal reflux disease without esophagitis  Not well controlled  She was given prescription for pantoprazole  Discussed about possible side effects  Come back in 3 weeks  Diagnoses and all orders for this visit:    Anxiety  -     LORazepam (ATIVAN) 0 5 mg tablet; Take 1 tablet (0 5 mg total) by mouth every 8 (eight) hours as needed for anxiety  -     escitalopram (Lexapro) 10 mg tablet; Take 1 tablet (10 mg total) by mouth in the morning  Nausea    Gastroesophageal reflux disease without esophagitis  -     pantoprazole (PROTONIX) 40 mg tablet; Take 1 tablet (40 mg total) by mouth daily before breakfast    Colon cancer screening  -     Ambulatory Referral to Gastroenterology; Future        There are no Patient Instructions on file for this visit  Return in about 3 weeks (around 6/14/2022)  Subjective:      Patient ID: Cony Man is a 46 y o  female  Chief Complaint   Patient presents with    Anxiety     1 month follow up       She is here today with complaint of having severe anxiety  She was started recently on Wellbutrin but she could not tolerate the medication  She was seen on May 22nd in the emergency room for anxiety attack  She stated she continues to have problem with anxiety  She stated she wants to feel better to be able to work  Recently she was  from her  after more than 21 years of marriage  She denies any suicidal homicidal thoughts        The following portions of the patient's history were reviewed and updated as appropriate: allergies, current medications, past family history, past medical history, past social history, past surgical history and problem list     Review of Systems   Constitutional: Positive for appetite change  Negative for chills and fever  HENT: Negative for trouble swallowing  Eyes: Negative for visual disturbance  Respiratory: Negative for cough and shortness of breath  Cardiovascular: Negative for chest pain, palpitations and leg swelling  Gastrointestinal: Positive for nausea  Negative for abdominal pain, constipation and diarrhea  GERD symptoms   Endocrine: Negative for cold intolerance and heat intolerance  Genitourinary: Negative for difficulty urinating and dysuria  Musculoskeletal: Negative for gait problem  Skin: Negative for rash  Neurological: Negative for dizziness, tremors, seizures and headaches  Hematological: Negative for adenopathy  Psychiatric/Behavioral: Positive for sleep disturbance  Negative for behavioral problems, self-injury and suicidal ideas  The patient is nervous/anxious  Current Outpatient Medications   Medication Sig Dispense Refill    albuterol (PROVENTIL HFA,VENTOLIN HFA) 90 mcg/act inhaler albuterol sulfate HFA 90 mcg/actuation aerosol inhaler      cyclobenzaprine (FLEXERIL) 10 mg tablet cyclobenzaprine 10 mg tablet   Take 1 tablet (10mg) by mouth As Needed  PRN      escitalopram (Lexapro) 10 mg tablet Take 1 tablet (10 mg total) by mouth in the morning   30 tablet 1    hyoscyamine (LEVSIN/SL) 0 125 mg SL tablet Take 0 125 mg by mouth every 4 (four) hours as needed for cramping      loratadine (CLARITIN) 10 mg tablet Take 10 mg by mouth daily      LORazepam (ATIVAN) 0 5 mg tablet Take 1 tablet (0 5 mg total) by mouth every 8 (eight) hours as needed for anxiety 30 tablet 0    montelukast (SINGULAIR) 10 mg tablet Take 10 mg by mouth daily at bedtime      ondansetron (Zofran ODT) 4 mg disintegrating tablet Take 1 tablet (4 mg total) by mouth every 6 (six) hours as needed for nausea or vomiting 30 tablet 0    pantoprazole (PROTONIX) 40 mg tablet Take 1 tablet (40 mg total) by mouth daily before breakfast 30 tablet 1    phenazopyridine (PYRIDIUM) 200 mg tablet Take 1 tablet (200 mg total) by mouth 3 (three) times a day with meals (Patient not taking: Reported on 5/13/2022) 10 tablet 0     No current facility-administered medications for this visit  Objective:    /84 (BP Location: Left arm, Patient Position: Sitting, Cuff Size: Standard)   Pulse (!) 108   Temp 97 6 °F (36 4 °C) (Tympanic)   Resp 16   Ht 5' (1 524 m)   Wt 77 6 kg (171 lb)   SpO2 98%   BMI 33 40 kg/m²        Physical Exam  Vitals and nursing note reviewed  Constitutional:       Appearance: She is well-developed  HENT:      Head: Normocephalic and atraumatic  Eyes:      Pupils: Pupils are equal, round, and reactive to light  Cardiovascular:      Rate and Rhythm: Normal rate and regular rhythm  Heart sounds: Normal heart sounds  Pulmonary:      Effort: Pulmonary effort is normal       Breath sounds: Normal breath sounds  Abdominal:      General: Bowel sounds are normal       Palpations: Abdomen is soft  Musculoskeletal:         General: Normal range of motion  Cervical back: Normal range of motion and neck supple  Lymphadenopathy:      Cervical: No cervical adenopathy  Skin:     General: Skin is warm  Neurological:      Mental Status: She is alert and oriented to person, place, and time  Cranial Nerves: No cranial nerve deficit                  Isaac Mchugh MD

## 2022-05-24 NOTE — ASSESSMENT & PLAN NOTE
Not well controlled  She was given prescription for pantoprazole  Discussed about possible side effects  Come back in 3 weeks

## 2022-05-24 NOTE — ASSESSMENT & PLAN NOTE
Not well controlled  I am going to start her on Lexapro 10 mg 1 tablet daily  Discussed about possible side effects  Also she was given prescription for Ativan 0 5 mg 1 tablet 3 times a day as needed  She was told this is temporary for now until her Lexapro start working  Come back in to 3 weeks

## 2022-05-24 NOTE — ASSESSMENT & PLAN NOTE
Was given prescription for pantoprazole  Discussed about possible side effects  Come back in 3 weeks

## 2022-05-24 NOTE — TELEPHONE ENCOUNTER
Pt was given the lexapro medication this morning and dr advised her to take a half dose for a few days, she wants to know how many days is a "few", please call pt to advise

## 2022-05-24 NOTE — TELEPHONE ENCOUNTER
I discussed with Dr Romeo Later and he wanted me to inform pt around 4 days   Pt is aware and agreed

## 2022-05-31 ENCOUNTER — TELEPHONE (OUTPATIENT)
Dept: ADMINISTRATIVE | Facility: OTHER | Age: 52
End: 2022-05-31

## 2022-05-31 NOTE — TELEPHONE ENCOUNTER
----- Message from Marion Arevalo sent at 5/27/2022 11:05 AM EDT -----  Regarding: Mammo  05/27/22 11:05 AM    Hello, our patient Jarocho Feliz has had Mammogram completed/performed  Please assist in updating the patient chart by pulling the Care Everywhere (CE) document  The date of service is 12/28/20       Thank you,  Marion Arevalo  [facility] [id number] [facility]

## 2022-05-31 NOTE — TELEPHONE ENCOUNTER
Upon review of the In Basket request we were able to locate, review, and update the patient chart as requested for Mammogram     Any additional questions or concerns should be emailed to the Practice Liaisons via [email or web address] email, please do not reply via In Basket      Thank you  Keisha Cosme

## 2022-06-06 ENCOUNTER — OFFICE VISIT (OUTPATIENT)
Dept: FAMILY MEDICINE CLINIC | Facility: CLINIC | Age: 52
End: 2022-06-06
Payer: COMMERCIAL

## 2022-06-06 VITALS
RESPIRATION RATE: 16 BRPM | OXYGEN SATURATION: 96 % | WEIGHT: 171 LBS | HEART RATE: 81 BPM | TEMPERATURE: 96.9 F | SYSTOLIC BLOOD PRESSURE: 126 MMHG | DIASTOLIC BLOOD PRESSURE: 80 MMHG | HEIGHT: 60 IN | BODY MASS INDEX: 33.57 KG/M2

## 2022-06-06 DIAGNOSIS — F41.9 ANXIETY: Primary | ICD-10-CM

## 2022-06-06 DIAGNOSIS — F41.9 ANXIETY: ICD-10-CM

## 2022-06-06 DIAGNOSIS — F33.9 DEPRESSION, RECURRENT (HCC): ICD-10-CM

## 2022-06-06 DIAGNOSIS — S61.512S: ICD-10-CM

## 2022-06-06 DIAGNOSIS — S61.511S LACERATION OF RIGHT WRIST, SEQUELA: ICD-10-CM

## 2022-06-06 PROBLEM — F33.2 MDD (MAJOR DEPRESSIVE DISORDER), RECURRENT SEVERE, WITHOUT PSYCHOSIS (HCC): Status: ACTIVE | Noted: 2022-06-06

## 2022-06-06 PROCEDURE — 1036F TOBACCO NON-USER: CPT | Performed by: FAMILY MEDICINE

## 2022-06-06 PROCEDURE — 3008F BODY MASS INDEX DOCD: CPT | Performed by: FAMILY MEDICINE

## 2022-06-06 PROCEDURE — 99214 OFFICE O/P EST MOD 30 MIN: CPT | Performed by: FAMILY MEDICINE

## 2022-06-06 RX ORDER — ESCITALOPRAM OXALATE 10 MG/1
15 TABLET ORAL DAILY
Qty: 45 TABLET | Refills: 1 | Status: SHIPPED | OUTPATIENT
Start: 2022-06-06 | End: 2022-06-06

## 2022-06-06 RX ORDER — HYDROXYZINE HYDROCHLORIDE 25 MG/1
25 TABLET, FILM COATED ORAL 2 TIMES DAILY PRN
Qty: 60 TABLET | Refills: 1 | Status: SHIPPED | OUTPATIENT
Start: 2022-06-06 | End: 2022-06-28

## 2022-06-06 RX ORDER — ESCITALOPRAM OXALATE 10 MG/1
TABLET ORAL
Qty: 45 TABLET | Refills: 1 | Status: SHIPPED | OUTPATIENT
Start: 2022-06-06

## 2022-06-06 NOTE — ASSESSMENT & PLAN NOTE
Improving  Patient denies any suicidal homicidal thoughts  I am going to increase her Lexapro to 15 mg daily  Discussed about possible side effects  It was discussed with patient if any suicidal or homicidal thoughts to go to the emergency room  Patient understood and agreed  Come back in 4 weeks and I will consider adding Wellbutrin if continues with depression

## 2022-06-06 NOTE — ASSESSMENT & PLAN NOTE
Not well controlled  Patient is still feeling anxious in the morning and has insomnia  Plan is for patient to increase her Lexapro to 20 mg daily  Patient also prescribed Atarax PRN anxiety  Patient educated on side effects of these medications  Continue to monitor and will follow up in our office in 1 month  Patient is establishing with a psychiatrist and therapist later this month

## 2022-06-06 NOTE — PROGRESS NOTES
Assessment/Plan:    Anxiety  Not well controlled  Patient is still feeling anxious in the morning and has insomnia  Plan is for patient to increase her Lexapro to 20 mg daily  Patient also prescribed Atarax PRN anxiety  Patient educated on side effects of these medications  Continue to monitor and will follow up in our office in 1 month  Patient is establishing with a psychiatrist and therapist later this month  Depression, recurrent ([facility], [address] )  Improving  Patient denies any suicidal homicidal thoughts  I am going to increase her Lexapro to 15 mg daily  Discussed about possible side effects  It was discussed with patient if any suicidal or homicidal thoughts to go to the emergency room  Patient understood and agreed  Come back in 4 weeks and I will consider adding Wellbutrin if continues with depression  Laceration of skin of left wrist  Suture removed  Patient tolerated procedure very well  Steri-Strips applied with Band-Aid  Laceration of right wrist  Suture removed  Patient tolerated procedure very well  Steri-Strips applied with Band-Aid         Problem List Items Addressed This Visit        Other    Depression, recurrent ([address] )     Improving  Patient denies any suicidal homicidal thoughts  I am going to increase her Lexapro to 15 mg daily  Discussed about possible side effects  It was discussed with patient if any suicidal or homicidal thoughts to go to the emergency room  Patient understood and agreed  Come back in 4 weeks and I will consider adding Wellbutrin if continues with depression  Relevant Medications    hydrOXYzine HCL (ATARAX) 25 mg tablet    Anxiety - Primary     Not well controlled  Patient is still feeling anxious in the morning and has insomnia  Plan is for patient to increase her Lexapro to 20 mg daily  Patient also prescribed Atarax PRN anxiety  Patient educated on side effects of these medications  Continue to monitor and will follow up in our office in 1 month  Patient is establishing with a psychiatrist and therapist later this month  Relevant Medications    hydrOXYzine HCL (ATARAX) 25 mg tablet    Laceration of right wrist     Suture removed  Patient tolerated procedure very well  Steri-Strips applied with Band-Aid           Relevant Orders    Suture removal (Completed)    Laceration of skin of left wrist     Suture removed  Patient tolerated procedure very well  Steri-Strips applied with Band-Aid  Relevant Orders    Suture removal (Completed)            Subjective:      Patient ID: Rene Roche is a 46 y o  female  HPI Elver Hodge is a 45 yo female here for a follow up visit post hospital stay for anxiety, depression, and self harm  Patient was admitted to the hospital 05/27/22 for cutting her wrists bilaterally with a razor  Before admission, patient was on Lexapro 10 mg daily and ativan 0 5 mg TID PRN anxiety  Patient admits to significant improvement in her mood since her hospital stay but over the past 4 days her anxiety has become more prominent in the mornings  Additionally, patient has been having poor sleep  Patient admits to sweating, palpitations, and feeling anxious during these episodes  Patient denies any chest pressure, SOB, abdominal pain, N/V/D  Patient denies any side effects from her medication  Patient denies any depression, thoughts to hurt herself, SI, or HI  The following portions of the patient's history were reviewed and updated as appropriate: allergies, current medications, past family history, past medical history, past social history, past surgical history and problem list     Review of Systems   Constitutional: Negative for chills, fatigue and unexpected weight change  Respiratory: Negative for cough, chest tightness and shortness of breath  Cardiovascular: Positive for palpitations (with anxiety)  Negative for chest pain and leg swelling     Gastrointestinal: Negative for abdominal pain, constipation, diarrhea, nausea and vomiting  Endocrine: Negative for polydipsia and polyuria  Genitourinary: Negative for difficulty urinating  Musculoskeletal: Negative for gait problem and joint swelling  Skin: Positive for wound  Neurological: Negative for dizziness, light-headedness and headaches  Psychiatric/Behavioral: Negative for self-injury and suicidal ideas  The patient is nervous/anxious  Objective:      /80 (BP Location: Left arm, Patient Position: Sitting, Cuff Size: Large)   Pulse 81   Temp (!) 96 9 °F (36 1 °C) (Tympanic)   Resp 16   Ht 5' (1 524 m)   Wt 77 6 kg (171 lb)   SpO2 96%   BMI 33 40 kg/m²          Physical Exam  Vitals and nursing note reviewed  Constitutional:       Appearance: Normal appearance  HENT:      Head: Normocephalic and atraumatic  Eyes:      Pupils: Pupils are equal, round, and reactive to light  Cardiovascular:      Rate and Rhythm: Normal rate and regular rhythm  Pulses: Normal pulses  Heart sounds: Normal heart sounds  Pulmonary:      Effort: Pulmonary effort is normal       Breath sounds: Normal breath sounds  Skin:     General: Skin is warm and dry  Neurological:      General: No focal deficit present  Mental Status: She is alert  Psychiatric:         Mood and Affect: Mood normal          Behavior: Behavior normal        Suture removal    Date/Time: 6/7/2022 6:23 AM  Performed by: Shasta Fonseca MD  Authorized by: Shasta Fonseca MD   Universal Protocol:  Consent: Verbal consent obtained  Consent given by: patient  Patient understanding: patient states understanding of the procedure being performed  Patient identity confirmed: verbally with patient        Patient location:  Clinic  Location:     Laterality:  Bilateral    Location:  Upper extremity    Upper extremity location:  Wrist    Wrist location:  R wrist and L wrist  Procedure details:      Tools used:  Suture removal kit    Wound appearance:  No sign(s) of infection, nontender, good wound healing and clean    Number of sutures removed:  20  Post-procedure details:     Post-removal:  Steri-Strips applied and Band-Aid applied    Patient tolerance of procedure:   Tolerated well, no immediate complications

## 2022-06-07 PROBLEM — S61.512A LACERATION OF SKIN OF LEFT WRIST: Status: ACTIVE | Noted: 2022-06-07

## 2022-06-07 PROBLEM — S61.511A LACERATION OF RIGHT WRIST: Status: ACTIVE | Noted: 2022-06-07

## 2022-06-15 DIAGNOSIS — K21.9 GASTROESOPHAGEAL REFLUX DISEASE WITHOUT ESOPHAGITIS: ICD-10-CM

## 2022-06-15 RX ORDER — PANTOPRAZOLE SODIUM 40 MG/1
TABLET, DELAYED RELEASE ORAL
Qty: 90 TABLET | Refills: 1 | Status: SHIPPED | OUTPATIENT
Start: 2022-06-15

## 2022-06-15 NOTE — TELEPHONE ENCOUNTER
Pharmacy requesting refill on pantoprazole   Last seen 6/6/22  Has appt 7/6/22  Medication sent to pharmacy

## 2022-06-22 ENCOUNTER — TELEPHONE (OUTPATIENT)
Dept: FAMILY MEDICINE CLINIC | Facility: CLINIC | Age: 52
End: 2022-06-22

## 2022-06-22 DIAGNOSIS — F33.9 DEPRESSION, RECURRENT (HCC): Primary | ICD-10-CM

## 2022-06-22 RX ORDER — BUPROPION HYDROCHLORIDE 150 MG/1
150 TABLET ORAL EVERY MORNING
Qty: 30 TABLET | Refills: 5 | Status: SHIPPED | OUTPATIENT
Start: 2022-06-22 | End: 2023-01-25 | Stop reason: SDUPTHER

## 2022-06-22 NOTE — TELEPHONE ENCOUNTER
Pt  States she is functioning okay as long as she is busy  As soon as she sits down , she falls asleep  pt states her wellbutrin was d/c due to possible involuntary movements  Dr Germain Gurrola Discussed possibly restarting wellbutrin in the morning and taking the lexapro  in the evening  Pt is sched to see Psych on 7/1/22  She is asking if Dr Germain Gurrola wants to address this or if she should wait until her psych appt  7/1/22    Please advise

## 2022-06-23 NOTE — TELEPHONE ENCOUNTER
PT aware Wellbutrin sent to pharmacy  Pt will take wellbutrin in morning and lexapro at night  Pt will  copy of FMLA

## 2022-06-28 DIAGNOSIS — F41.9 ANXIETY: ICD-10-CM

## 2022-06-28 RX ORDER — HYDROXYZINE HYDROCHLORIDE 25 MG/1
25 TABLET, FILM COATED ORAL 2 TIMES DAILY PRN
Qty: 180 TABLET | Refills: 0 | Status: SHIPPED | OUTPATIENT
Start: 2022-06-28

## 2022-07-06 ENCOUNTER — OFFICE VISIT (OUTPATIENT)
Dept: FAMILY MEDICINE CLINIC | Facility: CLINIC | Age: 52
End: 2022-07-06
Payer: COMMERCIAL

## 2022-07-06 VITALS
WEIGHT: 178.6 LBS | SYSTOLIC BLOOD PRESSURE: 126 MMHG | HEART RATE: 81 BPM | RESPIRATION RATE: 14 BRPM | OXYGEN SATURATION: 98 % | TEMPERATURE: 97.8 F | BODY MASS INDEX: 35.06 KG/M2 | DIASTOLIC BLOOD PRESSURE: 80 MMHG | HEIGHT: 60 IN

## 2022-07-06 DIAGNOSIS — S61.511D LACERATION OF RIGHT WRIST, SUBSEQUENT ENCOUNTER: ICD-10-CM

## 2022-07-06 DIAGNOSIS — F41.9 ANXIETY: Primary | ICD-10-CM

## 2022-07-06 DIAGNOSIS — S61.512D: ICD-10-CM

## 2022-07-06 DIAGNOSIS — F33.9 DEPRESSION, RECURRENT (HCC): ICD-10-CM

## 2022-07-06 PROCEDURE — 99214 OFFICE O/P EST MOD 30 MIN: CPT | Performed by: FAMILY MEDICINE

## 2022-07-06 NOTE — ASSESSMENT & PLAN NOTE
Well controlled  Continue lexapro 20 mg and atarax 25 mg PRN  Patient education about medication side effects provided  Advise patient to follow with psychiatrist as well  Call the office if symptoms worsen  Will continue to monitor symptoms and see patient back in 3 months

## 2022-07-06 NOTE — PROGRESS NOTES
Assessment/Plan:    Anxiety  Well controlled  Continue lexapro 20 mg and atarax 25 mg PRN  Patient education about medication side effects provided  Advise patient to follow with psychiatrist as well  Call the office if symptoms worsen  Will continue to monitor symptoms and see patient back in 3 months  Depression, recurrent ([address] )  Well controlled  Continue lexapro 20 mg  Will continue to monitor and follow up in 3 months  Laceration of right wrist  Bilateral lacerations well healed  Problem List Items Addressed This Visit        Other    Depression, recurrent ([address] )     Well controlled  Continue lexapro 20 mg  Will continue to monitor and follow up in 3 months  Anxiety - Primary     Well controlled  Continue lexapro 20 mg and atarax 25 mg PRN  Patient education about medication side effects provided  Advise patient to follow with psychiatrist as well  Call the office if symptoms worsen  Will continue to monitor symptoms and see patient back in 3 months  Laceration of right wrist     Bilateral lacerations well healed  Laceration of skin of left wrist            Subjective:      Patient ID: Mike Sloan is a 46 y o  female  Patient is a 64-year-old female with a PMH significant for anxiety, depression, and history of self harm presenting to the office today for follow-up management of these conditions  Patient was hospitalized on 05/27/22 for anxiety and self-harm with lacerations to her bilateral wrists  After hospitalization, her medications were adjusted to include lexapro 20 mg daily and atarax 25 mg PRN  She reports taking these medications as prescribed  She notes side effects of involuntary movements with occasional spasms of her hands  She also notes that in the afternoon, she begins to notice her hands shaking, which she attributes to the medication wearing off   She recently established care with a psychiatrist this past month, who advised against starting wellbutrin right now  Patient reports that her symptoms of anxiety and depression have improved significantly since her hospitalization, stating she feels "much, much better, but I occasionally have days where I feel blue"  She reports improved mood, concentration, and appetite  She notes she feels more energetic in the AM, but notes some malaise later in the evening  She denies any thoughts of hurting herself or others  She reports that her lacerations have healed well, but she experiences occasional pain and increased sensitivity in her wrists, especially on the lateral aspect of left wrist       Anxiety  Presents for follow-up visit  Symptoms include malaise  Patient reports no chest pain, decreased concentration, dizziness, excessive worry, insomnia, nausea, nervous/anxious behavior, palpitations, restlessness, shortness of breath or suicidal ideas  The following portions of the patient's history were reviewed and updated as appropriate: allergies, current medications, past family history, past medical history, past social history, past surgical history and problem list     Review of Systems   Constitutional: Negative for chills, diaphoresis, fatigue and fever  Respiratory: Negative for choking, chest tightness and shortness of breath  Cardiovascular: Negative for chest pain and palpitations  Gastrointestinal: Negative for abdominal pain, nausea and vomiting  Musculoskeletal:        Bilateral wrist pain   Neurological: Negative for dizziness, light-headedness, numbness and headaches  Psychiatric/Behavioral: Negative for decreased concentration, dysphoric mood, sleep disturbance and suicidal ideas  The patient is not nervous/anxious and does not have insomnia            Objective:      /80 (BP Location: Left arm, Patient Position: Sitting, Cuff Size: Large)   Pulse 81   Temp 97 8 °F (36 6 °C) (Tympanic)   Resp 14   Ht 5' (1 524 m)   Wt 81 kg (178 lb 9 6 oz)   SpO2 98%   BMI 34 88 kg/m²          Physical Exam  Constitutional:       General: She is not in acute distress  Appearance: Normal appearance  She is not toxic-appearing  Comments: Well-healed lacerations to the bilateral wrists noted  HENT:      Head: Normocephalic and atraumatic  Eyes:      Pupils: Pupils are equal, round, and reactive to light  Cardiovascular:      Rate and Rhythm: Normal rate and regular rhythm  Heart sounds: No murmur heard  No friction rub  No gallop  Pulmonary:      Effort: Pulmonary effort is normal       Breath sounds: Normal breath sounds  No wheezing, rhonchi or rales  Skin:     General: Skin is warm and dry  Neurological:      General: No focal deficit present  Mental Status: She is alert  Psychiatric:         Attention and Perception: Attention normal          Mood and Affect: Mood normal          Speech: Speech normal          Behavior: Behavior normal  Behavior is cooperative  Thought Content: Thought content normal  Thought content does not include homicidal or suicidal ideation           Judgment: Judgment normal

## 2022-08-16 DIAGNOSIS — F33.9 DEPRESSION, RECURRENT (HCC): ICD-10-CM

## 2022-08-16 RX ORDER — BUPROPION HYDROCHLORIDE 150 MG/1
TABLET ORAL
Qty: 90 TABLET | Refills: 1 | OUTPATIENT
Start: 2022-08-16

## 2022-08-16 NOTE — TELEPHONE ENCOUNTER
Pharmacy requesting refill on Wellbutrin  Per office note patient is not taking medication     Medication refused

## 2022-09-28 DIAGNOSIS — F41.9 ANXIETY: ICD-10-CM

## 2022-09-28 RX ORDER — HYDROXYZINE HYDROCHLORIDE 25 MG/1
25 TABLET, FILM COATED ORAL 2 TIMES DAILY PRN
Qty: 180 TABLET | Refills: 0 | Status: SHIPPED | OUTPATIENT
Start: 2022-09-28

## 2022-10-04 ENCOUNTER — RA CDI HCC (OUTPATIENT)
Dept: OTHER | Facility: HOSPITAL | Age: 52
End: 2022-10-04

## 2022-10-04 NOTE — PROGRESS NOTES
[facility]  coding opportunities       Chart reviewed, no opportunity found: CHART REVIEWED, NO OPPORTUNITY FOUND        Patients Insurance        Commercial Insurance: [employer]

## 2022-10-11 PROBLEM — Z00.00 HEALTHCARE MAINTENANCE: Status: RESOLVED | Noted: 2022-05-13 | Resolved: 2022-10-11

## 2022-10-11 PROBLEM — S61.512A LACERATION OF SKIN OF LEFT WRIST: Status: RESOLVED | Noted: 2022-06-07 | Resolved: 2022-10-11

## 2022-10-11 PROBLEM — S61.511A LACERATION OF RIGHT WRIST: Status: RESOLVED | Noted: 2022-06-07 | Resolved: 2022-10-11

## 2022-11-21 ENCOUNTER — TELEPHONE (OUTPATIENT)
Dept: NEPHROLOGY | Facility: HOSPITAL | Age: 52
End: 2022-11-21

## 2022-12-18 DIAGNOSIS — K21.9 GASTROESOPHAGEAL REFLUX DISEASE WITHOUT ESOPHAGITIS: ICD-10-CM

## 2022-12-19 RX ORDER — PANTOPRAZOLE SODIUM 40 MG/1
TABLET, DELAYED RELEASE ORAL
Qty: 90 TABLET | Refills: 0 | Status: SHIPPED | OUTPATIENT
Start: 2022-12-19

## 2023-01-25 ENCOUNTER — OFFICE VISIT (OUTPATIENT)
Dept: FAMILY MEDICINE CLINIC | Facility: CLINIC | Age: 53
End: 2023-01-25

## 2023-01-25 VITALS
DIASTOLIC BLOOD PRESSURE: 82 MMHG | WEIGHT: 181 LBS | BODY MASS INDEX: 35.53 KG/M2 | TEMPERATURE: 97.8 F | OXYGEN SATURATION: 98 % | HEART RATE: 76 BPM | RESPIRATION RATE: 16 BRPM | SYSTOLIC BLOOD PRESSURE: 124 MMHG | HEIGHT: 60 IN

## 2023-01-25 DIAGNOSIS — Z12.11 COLON CANCER SCREENING: ICD-10-CM

## 2023-01-25 DIAGNOSIS — E66.9 OBESITY (BMI 35.0-39.9 WITHOUT COMORBIDITY): ICD-10-CM

## 2023-01-25 DIAGNOSIS — Z13.83 SCREENING FOR CARDIOVASCULAR, RESPIRATORY, AND GENITOURINARY DISEASES: ICD-10-CM

## 2023-01-25 DIAGNOSIS — Z13.89 SCREENING FOR CARDIOVASCULAR, RESPIRATORY, AND GENITOURINARY DISEASES: ICD-10-CM

## 2023-01-25 DIAGNOSIS — J45.20 MILD INTERMITTENT ASTHMA WITHOUT COMPLICATION: ICD-10-CM

## 2023-01-25 DIAGNOSIS — E66.01 CLASS 2 SEVERE OBESITY DUE TO EXCESS CALORIES WITH SERIOUS COMORBIDITY AND BODY MASS INDEX (BMI) OF 35.0 TO 35.9 IN ADULT (HCC): ICD-10-CM

## 2023-01-25 DIAGNOSIS — F33.2 MDD (MAJOR DEPRESSIVE DISORDER), RECURRENT SEVERE, WITHOUT PSYCHOSIS (HCC): ICD-10-CM

## 2023-01-25 DIAGNOSIS — K21.9 GASTROESOPHAGEAL REFLUX DISEASE WITHOUT ESOPHAGITIS: Primary | ICD-10-CM

## 2023-01-25 DIAGNOSIS — F33.9 DEPRESSION, RECURRENT (HCC): ICD-10-CM

## 2023-01-25 DIAGNOSIS — F41.9 ANXIETY: ICD-10-CM

## 2023-01-25 DIAGNOSIS — Z13.6 SCREENING FOR CARDIOVASCULAR, RESPIRATORY, AND GENITOURINARY DISEASES: ICD-10-CM

## 2023-01-25 RX ORDER — HYDROXYZINE HYDROCHLORIDE 25 MG/1
25 TABLET, FILM COATED ORAL 2 TIMES DAILY PRN
Qty: 180 TABLET | Refills: 1 | Status: SHIPPED | OUTPATIENT
Start: 2023-01-25

## 2023-01-25 RX ORDER — PANTOPRAZOLE SODIUM 40 MG/1
40 TABLET, DELAYED RELEASE ORAL
Qty: 90 TABLET | Refills: 1 | Status: SHIPPED | OUTPATIENT
Start: 2023-01-25

## 2023-01-25 RX ORDER — BUPROPION HYDROCHLORIDE 150 MG/1
150 TABLET ORAL EVERY MORNING
Qty: 90 TABLET | Refills: 1 | Status: SHIPPED | OUTPATIENT
Start: 2023-01-25

## 2023-01-26 ENCOUNTER — TELEPHONE (OUTPATIENT)
Dept: FAMILY MEDICINE CLINIC | Facility: CLINIC | Age: 53
End: 2023-01-26

## 2023-01-26 RX ORDER — SEMAGLUTIDE 0.25 MG/.5ML
INJECTION, SOLUTION SUBCUTANEOUS
Qty: 2 ML | Refills: 0 | Status: SHIPPED | OUTPATIENT
Start: 2023-01-26

## 2023-01-26 NOTE — TELEPHONE ENCOUNTER
Per [facility] approved, valid 1/26/23-7/25/23  [facility] notified     Angelito Hickman #[phone]  Pt id# [id number]

## 2023-02-03 PROBLEM — E66.812 CLASS 2 SEVERE OBESITY DUE TO EXCESS CALORIES WITH SERIOUS COMORBIDITY AND BODY MASS INDEX (BMI) OF 35.0 TO 35.9 IN ADULT (HCC): Status: ACTIVE | Noted: 2023-02-03

## 2023-02-03 PROBLEM — E66.01 CLASS 2 SEVERE OBESITY DUE TO EXCESS CALORIES WITH SERIOUS COMORBIDITY AND BODY MASS INDEX (BMI) OF 35.0 TO 35.9 IN ADULT (HCC): Status: ACTIVE | Noted: 2023-02-03

## 2023-02-03 NOTE — PROGRESS NOTES
Name: Ugo Khoury      : 1970      MRN: [id number]  Encounter Provider: Ronda Chan MD  Encounter Date: 2023   Encounter department: [address] [place] [address]  Gastroesophageal reflux disease without esophagitis  Assessment & Plan:  Improved on pantoprazole  Continue same  We will continue to monitor  Orders:  -     pantoprazole (PROTONIX) 40 mg tablet; Take 1 tablet (40 mg total) by mouth daily before breakfast    2  Depression, recurrent ([facility] )  Assessment & Plan:  Stable  Continue same  We will continue to monitor  Orders:  -     buPROPion (Wellbutrin XL) 150 mg 24 hr tablet; Take 1 tablet (150 mg total) by mouth every morning    3  Anxiety  Assessment & Plan:  Better controlled on Atarax  Continue same  Was given refill  Orders:  -     hydrOXYzine HCL (ATARAX) 25 mg tablet; Take 1 tablet (25 mg total) by mouth 2 (two) times a day as needed for itching or anxiety    4  Class 2 severe obesity due to excess calories with serious comorbidity and body mass index (BMI) of 35 0 to 35 9 in adult [facility])  Assessment & Plan:  Not well controlled  I am going to start her on Wegovy 0 25 mg weekly  Discussed with possible side effect  Discussed about low-carb diet and regular exercise  I will follow-up with her in 2 months  5  MDD (major depressive disorder), recurrent severe, without psychosis ([facility] )  Assessment & Plan:  Stable  Continue on Wellbutrin and Lexapro  We will continue to monitor  6  Screening for cardiovascular, respiratory, and genitourinary diseases  -     CBC and differential; Future  -     Comprehensive metabolic panel; Future  -     Lipid Panel with Direct LDL reflex; Future  -     TSH, 3rd generation with Free T4 reflex; Future    7  Mild intermittent asthma without complication  Assessment & Plan:  Stable  Uses albuterol inhaler as needed  We will continue to monitor  8  Colon cancer screening  -     Rainer Morgan  Obesity (BMI 35 0-39 9 without comorbidity)           Subjective     She is here today for follow-up multiple medical problems  She has been taking medications  Denies any side effect  She denies any complaint today  Review of Systems   Constitutional: Negative for chills and fever  HENT: Negative for trouble swallowing  Eyes: Negative for visual disturbance  Respiratory: Negative for cough and shortness of breath  Cardiovascular: Negative for chest pain, palpitations and leg swelling  Gastrointestinal: Negative for abdominal pain, constipation and diarrhea  Endocrine: Negative for cold intolerance and heat intolerance  Genitourinary: Negative for difficulty urinating and dysuria  Musculoskeletal: Negative for gait problem  Skin: Negative for rash  Neurological: Negative for dizziness, tremors, seizures and headaches  Hematological: Negative for adenopathy  Psychiatric/Behavioral: Negative for behavioral problems         Past Medical History:   Diagnosis Date   • Allergic    • Anxiety    • Asthma    • GI symptoms 05/27/2020   • Lactose intolerance      Past Surgical History:   Procedure Laterality Date   • BACK SURGERY  2012   • OVARIAN CYST REMOVAL Left 2008   • UPPER GASTROINTESTINAL ENDOSCOPY  04/03/2007    Gastritis-biopsy negative for H  pylori by [facility]     Family History   Problem Relation Age of Onset   • Hypertension Father    • Hypertension Sister    • Lung cancer Sister    • Cancer Maternal Aunt    • Breast cancer additional onset Cousin    • Cancer Cousin      Social History     Socioeconomic History   • Marital status: /Civil Union     Spouse name: None   • Number of children: None   • Years of education: None   • Highest education level: None   Occupational History   • 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   Tobacco Use   • Smoking status: Never   • Smokeless tobacco: Never   • Tobacco comments:     no passive smoke exposure   Vaping Use   • Vaping Use: Never used Substance and Sexual Activity   • Alcohol use: Never   • Drug use: Never   • Sexual activity: None   Other Topics Concern   • None   Social History Narrative   • None     Social Determinants of Health     Financial Resource Strain: Not on file   Food Insecurity: Not on file   Transportation Needs: Not on file   Physical Activity: Not on file   Stress: Not on file   Social Connections: Not on file   Intimate Partner Violence: Not on file   Housing Stability: Not on file     Current Outpatient Medications on File Prior to Visit   Medication Sig   • albuterol (PROVENTIL HFA,VENTOLIN HFA) 90 mcg/act inhaler albuterol sulfate HFA 90 mcg/actuation aerosol inhaler   • loratadine (CLARITIN) 10 mg tablet Take 10 mg by mouth daily   • montelukast (SINGULAIR) 10 mg tablet Take 10 mg by mouth daily at bedtime   • cyclobenzaprine (FLEXERIL) 10 mg tablet cyclobenzaprine 10 mg tablet   Take 1 tablet (10mg) by mouth As Needed   PRN (Patient not taking: Reported on 1/25/2023)   • escitalopram (LEXAPRO) 10 mg tablet TAKE 1 AND 1/2 TABLETS DAILY BY MOUTH (Patient taking differently: Take 20 mg by mouth daily)   • hyoscyamine (LEVSIN/SL) 0 125 mg SL tablet Take 1 tablet (0 125 mg total) by mouth 2 (two) times a day   • LORazepam (ATIVAN) 0 5 mg tablet Take 1 tablet (0 5 mg total) by mouth every 8 (eight) hours as needed for anxiety (Patient not taking: No sig reported)   • ondansetron (Zofran ODT) 4 mg disintegrating tablet Take 1 tablet (4 mg total) by mouth every 6 (six) hours as needed for nausea or vomiting (Patient not taking: Reported on 7/6/2022)   • phenazopyridine (PYRIDIUM) 200 mg tablet Take 1 tablet (200 mg total) by mouth 3 (three) times a day with meals (Patient not taking: No sig reported)     Allergies   Allergen Reactions   • Codeine GI Intolerance and Other (See Comments)   • Hydrocodone-Acetaminophen Other (See Comments) and Headache     Immunization History   Administered Date(s) Administered   • COVID-19 PFIZER VACCINE 0 3 ML IM 04/16/2021, 05/07/2021   • Td (adult), Unspecified 09/30/2012   • Tdap 09/30/2012       Objective     /82 (BP Location: Left arm, Patient Position: Sitting, Cuff Size: Large)   Pulse 76   Temp 97 8 °F (36 6 °C) (Tympanic)   Resp 16   Ht 5' (1 524 m)   Wt 82 1 kg (181 lb)   SpO2 98%   BMI 35 35 kg/m²     Physical Exam  Vitals and nursing note reviewed  Constitutional:       Appearance: She is well-developed  HENT:      Head: Normocephalic and atraumatic  Eyes:      Pupils: Pupils are equal, round, and reactive to light  Cardiovascular:      Rate and Rhythm: Normal rate and regular rhythm  Heart sounds: Normal heart sounds  Pulmonary:      Effort: Pulmonary effort is normal       Breath sounds: Normal breath sounds  Abdominal:      General: Bowel sounds are normal       Palpations: Abdomen is soft  Musculoskeletal:         General: Normal range of motion  Cervical back: Normal range of motion and neck supple  Lymphadenopathy:      Cervical: No cervical adenopathy  Skin:     General: Skin is warm  Neurological:      Mental Status: She is alert and oriented to person, place, and time  Cranial Nerves: No cranial nerve deficit  Farrukh Tinoco MD BMI Counseling: Body mass index is 35 35 kg/m²  The BMI is above normal  Nutrition recommendations include 3-5 servings of fruits/vegetables daily  Exercise recommendations include moderate aerobic physical activity for 150 minutes/week

## 2023-02-03 NOTE — ASSESSMENT & PLAN NOTE
Not well controlled  I am going to start her on Wegovy 0 25 mg weekly  Discussed with possible side effect  Discussed about low-carb diet and regular exercise  I will follow-up with her in 2 months

## 2023-02-11 ENCOUNTER — APPOINTMENT (OUTPATIENT)
Dept: LAB | Age: 53
End: 2023-02-11

## 2023-02-11 DIAGNOSIS — Z13.6 SCREENING FOR CARDIOVASCULAR, RESPIRATORY, AND GENITOURINARY DISEASES: ICD-10-CM

## 2023-02-11 DIAGNOSIS — Z13.83 SCREENING FOR CARDIOVASCULAR, RESPIRATORY, AND GENITOURINARY DISEASES: ICD-10-CM

## 2023-02-11 DIAGNOSIS — Z13.89 SCREENING FOR CARDIOVASCULAR, RESPIRATORY, AND GENITOURINARY DISEASES: ICD-10-CM

## 2023-02-11 LAB
ALBUMIN SERPL BCP-MCNC: 3.9 G/DL (ref 3.5–5)
ALP SERPL-CCNC: 78 U/L (ref 46–116)
ALT SERPL W P-5'-P-CCNC: 23 U/L (ref 12–78)
ANION GAP SERPL CALCULATED.3IONS-SCNC: 3 MMOL/L (ref 4–13)
AST SERPL W P-5'-P-CCNC: 17 U/L (ref 5–45)
BASOPHILS # BLD AUTO: 0.07 THOUSANDS/ÂΜL (ref 0–0.1)
BASOPHILS NFR BLD AUTO: 1 % (ref 0–1)
BILIRUB SERPL-MCNC: 0.56 MG/DL (ref 0.2–1)
BUN SERPL-MCNC: 14 MG/DL (ref 5–25)
CALCIUM SERPL-MCNC: 9.2 MG/DL (ref 8.3–10.1)
CHLORIDE SERPL-SCNC: 107 MMOL/L (ref 96–108)
CHOLEST SERPL-MCNC: 142 MG/DL
CO2 SERPL-SCNC: 27 MMOL/L (ref 21–32)
CREAT SERPL-MCNC: 0.73 MG/DL (ref 0.6–1.3)
EOSINOPHIL # BLD AUTO: 0.24 THOUSAND/ÂΜL (ref 0–0.61)
EOSINOPHIL NFR BLD AUTO: 3 % (ref 0–6)
ERYTHROCYTE [DISTWIDTH] IN BLOOD BY AUTOMATED COUNT: 12 % (ref 11.6–15.1)
GFR SERPL CREATININE-BSD FRML MDRD: 93 ML/MIN/1.73SQ M
GLUCOSE P FAST SERPL-MCNC: 74 MG/DL (ref 65–99)
HCT VFR BLD AUTO: 39.9 % (ref 34.8–46.1)
HDLC SERPL-MCNC: 54 MG/DL
HGB BLD-MCNC: 13.2 G/DL (ref 11.5–15.4)
IMM GRANULOCYTES # BLD AUTO: 0.03 THOUSAND/UL (ref 0–0.2)
IMM GRANULOCYTES NFR BLD AUTO: 0 % (ref 0–2)
LDLC SERPL CALC-MCNC: 74 MG/DL (ref 0–100)
LYMPHOCYTES # BLD AUTO: 2.12 THOUSANDS/ÂΜL (ref 0.6–4.47)
LYMPHOCYTES NFR BLD AUTO: 28 % (ref 14–44)
MCH RBC QN AUTO: 29.9 PG (ref 26.8–34.3)
MCHC RBC AUTO-ENTMCNC: 33.1 G/DL (ref 31.4–37.4)
MCV RBC AUTO: 91 FL (ref 82–98)
MONOCYTES # BLD AUTO: 0.51 THOUSAND/ÂΜL (ref 0.17–1.22)
MONOCYTES NFR BLD AUTO: 7 % (ref 4–12)
NEUTROPHILS # BLD AUTO: 4.66 THOUSANDS/ÂΜL (ref 1.85–7.62)
NEUTS SEG NFR BLD AUTO: 61 % (ref 43–75)
NRBC BLD AUTO-RTO: 0 /100 WBCS
PLATELET # BLD AUTO: 346 THOUSANDS/UL (ref 149–390)
PMV BLD AUTO: 10.7 FL (ref 8.9–12.7)
POTASSIUM SERPL-SCNC: 4 MMOL/L (ref 3.5–5.3)
PROT SERPL-MCNC: 7 G/DL (ref 6.4–8.4)
RBC # BLD AUTO: 4.41 MILLION/UL (ref 3.81–5.12)
SODIUM SERPL-SCNC: 137 MMOL/L (ref 135–147)
TRIGL SERPL-MCNC: 72 MG/DL
TSH SERPL DL<=0.05 MIU/L-ACNC: 0.88 UIU/ML (ref 0.45–4.5)
WBC # BLD AUTO: 7.63 THOUSAND/UL (ref 4.31–10.16)

## 2023-02-17 ENCOUNTER — TELEPHONE (OUTPATIENT)
Dept: FAMILY MEDICINE CLINIC | Facility: CLINIC | Age: 53
End: 2023-02-17

## 2023-02-17 DIAGNOSIS — M54.9 ACUTE BACK PAIN, UNSPECIFIED BACK LOCATION, UNSPECIFIED BACK PAIN LATERALITY: Primary | ICD-10-CM

## 2023-02-17 RX ORDER — CYCLOBENZAPRINE HCL 10 MG
10 TABLET ORAL DAILY PRN
Qty: 30 TABLET | Refills: 0 | Status: SHIPPED | OUTPATIENT
Start: 2023-02-17

## 2023-02-17 NOTE — TELEPHONE ENCOUNTER
Pt twisted her back last night and had some flexeril left over and took one with helped her sleep but now she is all out, wants to know if we can call her in a refill

## 2023-02-25 LAB — COLOGUARD RESULT REPORTABLE: NEGATIVE

## 2023-03-08 ENCOUNTER — OFFICE VISIT (OUTPATIENT)
Dept: FAMILY MEDICINE CLINIC | Facility: CLINIC | Age: 53
End: 2023-03-08

## 2023-03-08 VITALS
WEIGHT: 176 LBS | DIASTOLIC BLOOD PRESSURE: 70 MMHG | HEART RATE: 89 BPM | RESPIRATION RATE: 16 BRPM | BODY MASS INDEX: 34.55 KG/M2 | OXYGEN SATURATION: 97 % | SYSTOLIC BLOOD PRESSURE: 124 MMHG | TEMPERATURE: 97.8 F | HEIGHT: 60 IN

## 2023-03-08 DIAGNOSIS — K21.9 GASTROESOPHAGEAL REFLUX DISEASE WITHOUT ESOPHAGITIS: ICD-10-CM

## 2023-03-08 DIAGNOSIS — J45.20 MILD INTERMITTENT ASTHMA WITHOUT COMPLICATION: ICD-10-CM

## 2023-03-08 DIAGNOSIS — E66.09 CLASS 1 OBESITY DUE TO EXCESS CALORIES WITH SERIOUS COMORBIDITY AND BODY MASS INDEX (BMI) OF 34.0 TO 34.9 IN ADULT: Primary | ICD-10-CM

## 2023-03-08 DIAGNOSIS — F33.2 MDD (MAJOR DEPRESSIVE DISORDER), RECURRENT SEVERE, WITHOUT PSYCHOSIS (HCC): ICD-10-CM

## 2023-03-08 DIAGNOSIS — R11.0 NAUSEA: ICD-10-CM

## 2023-03-08 PROBLEM — E66.811 CLASS 1 OBESITY DUE TO EXCESS CALORIES WITH SERIOUS COMORBIDITY AND BODY MASS INDEX (BMI) OF 34.0 TO 34.9 IN ADULT: Status: ACTIVE | Noted: 2023-02-03

## 2023-03-08 RX ORDER — ONDANSETRON 4 MG/1
4 TABLET, ORALLY DISINTEGRATING ORAL EVERY 6 HOURS PRN
Qty: 30 TABLET | Refills: 0 | Status: SHIPPED | OUTPATIENT
Start: 2023-03-08

## 2023-03-08 NOTE — PROGRESS NOTES
Name: Sarah Beth Lucas      : 1970      MRN: [id number]  Encounter Provider: Dirk Blanco MD  Encounter Date: 3/8/2023   Encounter department: [address] [place] [address]  Class 1 obesity due to excess calories with serious comorbidity and body mass index (BMI) of 34 0 to 34 9 in adult  Assessment & Plan:  Improving on Wegovy  She lost 5 pounds  I am going to increase Wegovy to 0 5 mg weekly  Discussed about low-carb diet and regular exercise  Come back in 1 month  Orders:  -     Semaglutide-Weight Management (WEGOVY) 0 5 MG/0 5ML; Inject 0 5 mL (0 5 mg total) under the skin once a week for 28 days    2  Mild intermittent asthma without complication  Assessment & Plan:  Stable  Continue albuterol as needed  We will continue to monitor  3  Gastroesophageal reflux disease without esophagitis  Assessment & Plan:  Stable  Continue on pantoprazole  We will continue to monitor  4  Nausea  -     ondansetron (Zofran ODT) 4 mg disintegrating tablet; Take 1 tablet (4 mg total) by mouth every 6 (six) hours as needed for nausea or vomiting    5  MDD (major depressive disorder), recurrent severe, without psychosis ([facility] )  Assessment & Plan:  Well-controlled on Wellbutrin 150 mg daily  Continue same  We will continue to monitor  Subjective     She is here today for follow-up multiple medical problems  She has had blood work done recently came back normal   She has been using Wegovy 0 25 mg weekly and she has been doing well on the medication other than with some side effects including nausea  She has been taking Zofran with some benefit  She lost 5 pounds since last visit  Review of Systems   Constitutional: Negative for chills and fever  HENT: Negative for trouble swallowing  Eyes: Negative for visual disturbance  Respiratory: Negative for cough and shortness of breath      Cardiovascular: Negative for chest pain, palpitations and leg swelling  Gastrointestinal: Negative for abdominal pain, constipation and diarrhea  Endocrine: Negative for cold intolerance and heat intolerance  Genitourinary: Negative for difficulty urinating and dysuria  Musculoskeletal: Negative for gait problem  Skin: Negative for rash  Neurological: Negative for dizziness, tremors, seizures and headaches  Hematological: Negative for adenopathy  Psychiatric/Behavioral: Negative for behavioral problems         Past Medical History:   Diagnosis Date   • Allergic    • Anxiety    • Asthma    • GI symptoms 05/27/2020   • Lactose intolerance      Past Surgical History:   Procedure Laterality Date   • BACK SURGERY  2012   • OVARIAN CYST REMOVAL Left 2008   • UPPER GASTROINTESTINAL ENDOSCOPY  04/03/2007    Gastritis-biopsy negative for H  pylori by [facility] GI     Family History   Problem Relation Age of Onset   • Hypertension Father    • Hypertension Sister    • Lung cancer Sister    • Cancer Maternal Aunt    • Breast cancer additional onset Cousin    • Cancer Cousin      Social History     Socioeconomic History   • Marital status: /Civil Union     Spouse name: None   • Number of children: None   • Years of education: None   • Highest education level: None   Occupational History   • 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   Tobacco Use   • Smoking status: Never   • Smokeless tobacco: Never   • Tobacco comments:     no passive smoke exposure   Vaping Use   • Vaping Use: Never used   Substance and Sexual Activity   • Alcohol use: Never   • Drug use: Never   • Sexual activity: None   Other Topics Concern   • None   Social History Narrative   • None     Social Determinants of Health     Financial Resource Strain: Not on file   Food Insecurity: Not on file   Transportation Needs: Not on file   Physical Activity: Not on file   Stress: Not on file   Social Connections: Not on file   Intimate Partner Violence: Not on file   Housing Stability: Not on file     Current Outpatient Medications on File Prior to Visit   Medication Sig   • albuterol (PROVENTIL HFA,VENTOLIN HFA) 90 mcg/act inhaler albuterol sulfate HFA 90 mcg/actuation aerosol inhaler   • buPROPion (Wellbutrin XL) 150 mg 24 hr tablet Take 1 tablet (150 mg total) by mouth every morning   • cyclobenzaprine (FLEXERIL) 10 mg tablet Take 1 tablet (10 mg total) by mouth daily as needed for muscle spasms   • hydrOXYzine HCL (ATARAX) 25 mg tablet Take 1 tablet (25 mg total) by mouth 2 (two) times a day as needed for itching or anxiety   • loratadine (CLARITIN) 10 mg tablet Take 10 mg by mouth daily   • montelukast (SINGULAIR) 10 mg tablet Take 10 mg by mouth daily at bedtime   • pantoprazole (PROTONIX) 40 mg tablet Take 1 tablet (40 mg total) by mouth daily before breakfast   • [DISCONTINUED] Wegovy 0 25 MG/0 5ML INJECT 0 5 ML (0 25 MG TOTAL) UNDER THE SKIN ONCE A WEEK FOR 28 DAYS   • escitalopram (LEXAPRO) 10 mg tablet TAKE 1 AND 1/2 TABLETS DAILY BY MOUTH (Patient taking differently: Take 20 mg by mouth daily)   • hyoscyamine (LEVSIN/SL) 0 125 mg SL tablet Take 1 tablet (0 125 mg total) by mouth 2 (two) times a day   • LORazepam (ATIVAN) 0 5 mg tablet Take 1 tablet (0 5 mg total) by mouth every 8 (eight) hours as needed for anxiety (Patient not taking: Reported on 6/6/2022)   • phenazopyridine (PYRIDIUM) 200 mg tablet Take 1 tablet (200 mg total) by mouth 3 (three) times a day with meals (Patient not taking: Reported on 5/13/2022)   • [DISCONTINUED] ondansetron (Zofran ODT) 4 mg disintegrating tablet Take 1 tablet (4 mg total) by mouth every 6 (six) hours as needed for nausea or vomiting (Patient not taking: Reported on 7/6/2022)     Allergies   Allergen Reactions   • Codeine GI Intolerance and Other (See Comments)   • Hydrocodone-Acetaminophen Other (See Comments) and Headache     Immunization History   Administered Date(s) Administered   • COVID-19 PFIZER VACCINE 0 3 ML IM 04/16/2021, 05/07/2021   • Td (adult), Unspecified 09/30/2012 • Tdap 09/30/2012       Objective     /70 (BP Location: Left arm, Patient Position: Sitting, Cuff Size: Large)   Pulse 89   Temp 97 8 °F (36 6 °C) (Tympanic)   Resp 16   Ht 5' (1 524 m)   Wt 79 8 kg (176 lb)   SpO2 97%   BMI 34 37 kg/m²     Physical Exam  Vitals and nursing note reviewed  Constitutional:       Appearance: She is well-developed  HENT:      Head: Normocephalic and atraumatic  Eyes:      Pupils: Pupils are equal, round, and reactive to light  Cardiovascular:      Rate and Rhythm: Normal rate and regular rhythm  Heart sounds: Normal heart sounds  Pulmonary:      Effort: Pulmonary effort is normal       Breath sounds: Normal breath sounds  Abdominal:      General: Bowel sounds are normal       Palpations: Abdomen is soft  Musculoskeletal:         General: Normal range of motion  Cervical back: Normal range of motion and neck supple  Lymphadenopathy:      Cervical: No cervical adenopathy  Skin:     General: Skin is warm  Neurological:      Mental Status: She is alert and oriented to person, place, and time  Cranial Nerves: No cranial nerve deficit         Enmanuel Quan MD

## 2023-03-08 NOTE — ASSESSMENT & PLAN NOTE
Improving on Wegovy  She lost 5 pounds  I am going to increase Wegovy to 0 5 mg weekly  Discussed about low-carb diet and regular exercise  Come back in 1 month

## 2023-03-09 ENCOUNTER — TELEPHONE (OUTPATIENT)
Dept: FAMILY MEDICINE CLINIC | Facility: CLINIC | Age: 53
End: 2023-03-09

## 2023-03-17 ENCOUNTER — TELEPHONE (OUTPATIENT)
Dept: FAMILY MEDICINE CLINIC | Facility: CLINIC | Age: 53
End: 2023-03-17

## 2023-03-17 DIAGNOSIS — R09.82 PND (POST-NASAL DRIP): Primary | ICD-10-CM

## 2023-03-17 RX ORDER — FLUTICASONE PROPIONATE 50 MCG
1 SPRAY, SUSPENSION (ML) NASAL DAILY
Qty: 16 G | Refills: 0 | Status: SHIPPED | OUTPATIENT
Start: 2023-03-17

## 2023-03-17 NOTE — TELEPHONE ENCOUNTER
Pt returned call, c/o PND for a few weeks  Started with sore neck , runny nose and chills today  No  relief with nasal saline, claritin, atarax  Per [facility], Flonase and increase fluids  Care now if increase symptoms  Pt aware and agrees

## 2023-03-17 NOTE — TELEPHONE ENCOUNTER
Patient left a message Thursday 3/16/23 and said she has severe post nasal drip that is making her nauseous and is taking claritin and saline nasal spray  Is there something else she can take or do for this?

## 2023-03-25 DIAGNOSIS — M54.9 ACUTE BACK PAIN, UNSPECIFIED BACK LOCATION, UNSPECIFIED BACK PAIN LATERALITY: ICD-10-CM

## 2023-03-27 RX ORDER — CYCLOBENZAPRINE HCL 10 MG
TABLET ORAL
Qty: 30 TABLET | Refills: 0 | Status: SHIPPED | OUTPATIENT
Start: 2023-03-27

## 2023-03-31 ENCOUNTER — OFFICE VISIT (OUTPATIENT)
Dept: FAMILY MEDICINE CLINIC | Facility: CLINIC | Age: 53
End: 2023-03-31

## 2023-03-31 VITALS
SYSTOLIC BLOOD PRESSURE: 118 MMHG | HEART RATE: 88 BPM | BODY MASS INDEX: 33.53 KG/M2 | HEIGHT: 60 IN | WEIGHT: 170.8 LBS | OXYGEN SATURATION: 97 % | RESPIRATION RATE: 16 BRPM | DIASTOLIC BLOOD PRESSURE: 80 MMHG | TEMPERATURE: 97.6 F

## 2023-03-31 DIAGNOSIS — E66.09 CLASS 1 OBESITY DUE TO EXCESS CALORIES WITH SERIOUS COMORBIDITY AND BODY MASS INDEX (BMI) OF 33.0 TO 33.9 IN ADULT: Primary | ICD-10-CM

## 2023-03-31 DIAGNOSIS — K21.9 GASTROESOPHAGEAL REFLUX DISEASE WITHOUT ESOPHAGITIS: ICD-10-CM

## 2023-03-31 NOTE — PROGRESS NOTES
Name: Monty Jackson      : 1970      MRN: [id number]  Encounter Provider: Zulema Varner MD  Encounter Date: 3/31/2023   Encounter department: [address] [place] [address]  Class 1 obesity due to excess calories with serious comorbidity and body mass index (BMI) of 33 0 to 33 9 in adult  Assessment & Plan:  Improving  I am going to increase Wegovy to 1 mg weekly  Discussed the possible side effect  It was discussed about low-carb diet and regular exercise  Come back in 1 month for follow-up  Orders:  -     Semaglutide-Weight Management (Hedda [place]) 1 MG/0 5ML; Inject 0 5 mL (1 mg total) under the skin once a week for 28 days    2  Gastroesophageal reflux disease without esophagitis  Assessment & Plan:  Stable  Continue same  We will continue to monitor  Subjective     She is here today for follow-up for weight management  She has been using Wegovy 0 5 mg weekly  She lost 6 since her last visit  She denies any side effects with medication other than nauseous feeling and she is taking Zofran to help  Review of Systems   Constitutional: Negative for chills and fever  HENT: Negative for trouble swallowing  Eyes: Negative for visual disturbance  Respiratory: Negative for cough and shortness of breath  Cardiovascular: Negative for chest pain, palpitations and leg swelling  Gastrointestinal: Negative for abdominal pain, constipation and diarrhea  Endocrine: Negative for cold intolerance and heat intolerance  Genitourinary: Negative for difficulty urinating and dysuria  Musculoskeletal: Negative for gait problem  Skin: Negative for rash  Neurological: Negative for dizziness, tremors, seizures and headaches  Hematological: Negative for adenopathy  Psychiatric/Behavioral: Negative for behavioral problems         Past Medical History:   Diagnosis Date   • Allergic    • Anxiety    • Asthma    • GI symptoms 2020   • Lactose intolerance      Past Surgical History:   Procedure Laterality Date   • BACK SURGERY  2012   • OVARIAN CYST REMOVAL Left 2008   • UPPER GASTROINTESTINAL ENDOSCOPY  04/03/2007    Gastritis-biopsy negative for H  pylori by [facility]     Family History   Problem Relation Age of Onset   • Hypertension Father    • Hypertension Sister    • Lung cancer Sister    • Cancer Maternal Aunt    • Breast cancer additional onset Cousin    • Cancer Cousin      Social History     Socioeconomic History   • Marital status: /Civil Union     Spouse name: None   • Number of children: None   • Years of education: None   • Highest education level: None   Occupational History   • 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   Tobacco Use   • Smoking status: Never   • Smokeless tobacco: Never   • Tobacco comments:     no passive smoke exposure   Vaping Use   • Vaping Use: Never used   Substance and Sexual Activity   • Alcohol use: Never   • Drug use: Never   • Sexual activity: None   Other Topics Concern   • None   Social History Narrative   • None     Social Determinants of Health     Financial Resource Strain: Not on file   Food Insecurity: Not on file   Transportation Needs: Not on file   Physical Activity: Not on file   Stress: Not on file   Social Connections: Not on file   Intimate Partner Violence: Not on file   Housing Stability: Not on file     Current Outpatient Medications on File Prior to Visit   Medication Sig   • albuterol (PROVENTIL HFA,VENTOLIN HFA) 90 mcg/act inhaler albuterol sulfate HFA 90 mcg/actuation aerosol inhaler   • buPROPion (Wellbutrin XL) 150 mg 24 hr tablet Take 1 tablet (150 mg total) by mouth every morning   • cyclobenzaprine (FLEXERIL) 10 mg tablet TAKE 1 TABLET BY MOUTH DAILY AS NEEDED FOR MUSCLE SPASMS     • fluticasone (FLONASE) 50 mcg/act nasal spray 1 spray into each nostril daily   • hydrOXYzine HCL (ATARAX) 25 mg tablet Take 1 tablet (25 mg total) by mouth 2 (two) times a day as needed for itching or anxiety   • loratadine (CLARITIN) 10 mg tablet Take 10 mg by mouth daily   • LORazepam (ATIVAN) 0 5 mg tablet Take 1 tablet (0 5 mg total) by mouth every 8 (eight) hours as needed for anxiety   • montelukast (SINGULAIR) 10 mg tablet Take 10 mg by mouth daily at bedtime   • ondansetron (Zofran ODT) 4 mg disintegrating tablet Take 1 tablet (4 mg total) by mouth every 6 (six) hours as needed for nausea or vomiting   • pantoprazole (PROTONIX) 40 mg tablet Take 1 tablet (40 mg total) by mouth daily before breakfast   • [DISCONTINUED] Semaglutide-Weight Management (WEGOVY) 0 5 MG/0 5ML Inject 0 5 mL (0 5 mg total) under the skin once a week for 28 days   • escitalopram (LEXAPRO) 10 mg tablet TAKE 1 AND 1/2 TABLETS DAILY BY MOUTH (Patient taking differently: Take 20 mg by mouth daily)   • hyoscyamine (LEVSIN/SL) 0 125 mg SL tablet Take 1 tablet (0 125 mg total) by mouth 2 (two) times a day   • phenazopyridine (PYRIDIUM) 200 mg tablet Take 1 tablet (200 mg total) by mouth 3 (three) times a day with meals (Patient not taking: Reported on 5/13/2022)     Allergies   Allergen Reactions   • Codeine GI Intolerance and Other (See Comments)   • Hydrocodone-Acetaminophen Other (See Comments) and Headache     Immunization History   Administered Date(s) Administered   • COVID-19 PFIZER VACCINE 0 3 ML IM 04/16/2021, 05/07/2021   • Td (adult), Unspecified 09/30/2012   • Tdap 09/30/2012       Objective     /80 (BP Location: Left arm, Patient Position: Sitting, Cuff Size: Large)   Pulse 88   Temp 97 6 °F (36 4 °C) (Tympanic)   Resp 16   Ht 5' (1 524 m)   Wt 77 5 kg (170 lb 12 8 oz)   SpO2 97%   BMI 33 36 kg/m²     Physical Exam  Vitals and nursing note reviewed  Constitutional:       Appearance: She is well-developed  HENT:      Head: Normocephalic and atraumatic  Eyes:      Pupils: Pupils are equal, round, and reactive to light  Cardiovascular:      Rate and Rhythm: Normal rate and regular rhythm        Heart sounds: Normal heart sounds  Pulmonary:      Effort: Pulmonary effort is normal       Breath sounds: Normal breath sounds  Abdominal:      General: Bowel sounds are normal       Palpations: Abdomen is soft  Musculoskeletal:         General: Normal range of motion  Cervical back: Normal range of motion and neck supple  Lymphadenopathy:      Cervical: No cervical adenopathy  Skin:     General: Skin is warm  Neurological:      Mental Status: She is alert and oriented to person, place, and time  Cranial Nerves: No cranial nerve deficit         Blake Trevino MD

## 2023-03-31 NOTE — ASSESSMENT & PLAN NOTE
Improving  I am going to increase Wegovy to 1 mg weekly  Discussed the possible side effect  It was discussed about low-carb diet and regular exercise  Come back in 1 month for follow-up

## 2023-04-03 DIAGNOSIS — K21.9 GASTROESOPHAGEAL REFLUX DISEASE WITHOUT ESOPHAGITIS: ICD-10-CM

## 2023-04-03 RX ORDER — PANTOPRAZOLE SODIUM 40 MG/1
40 TABLET, DELAYED RELEASE ORAL
Qty: 90 TABLET | Refills: 1 | Status: SHIPPED | OUTPATIENT
Start: 2023-04-03

## 2023-04-07 ENCOUNTER — TELEPHONE (OUTPATIENT)
Dept: FAMILY MEDICINE CLINIC | Facility: CLINIC | Age: 53
End: 2023-04-07

## 2023-04-07 NOTE — TELEPHONE ENCOUNTER
Wergovy 1mg prior auth started with Cayden Huang  (UG#[id number])  Pt TR#[id number]  Outcome pending  Marked as urgent   Per miriam pt will need prior auth every dose increase  Pt start weight 181 bmi 35 35 needs 5% weight loss  Pt aware

## 2023-05-01 ENCOUNTER — OFFICE VISIT (OUTPATIENT)
Dept: FAMILY MEDICINE CLINIC | Facility: CLINIC | Age: 53
End: 2023-05-01

## 2023-05-01 VITALS
OXYGEN SATURATION: 99 % | HEIGHT: 60 IN | HEART RATE: 103 BPM | DIASTOLIC BLOOD PRESSURE: 78 MMHG | BODY MASS INDEX: 31.88 KG/M2 | TEMPERATURE: 97.8 F | WEIGHT: 162.4 LBS | RESPIRATION RATE: 16 BRPM | SYSTOLIC BLOOD PRESSURE: 110 MMHG

## 2023-05-01 DIAGNOSIS — Z12.31 ENCOUNTER FOR SCREENING MAMMOGRAM FOR MALIGNANT NEOPLASM OF BREAST: ICD-10-CM

## 2023-05-01 DIAGNOSIS — Z78.0 ASYMPTOMATIC MENOPAUSE: ICD-10-CM

## 2023-05-01 DIAGNOSIS — Z00.00 HEALTHCARE MAINTENANCE: ICD-10-CM

## 2023-05-01 DIAGNOSIS — K21.9 GASTROESOPHAGEAL REFLUX DISEASE WITHOUT ESOPHAGITIS: Primary | ICD-10-CM

## 2023-05-01 DIAGNOSIS — E66.09 CLASS 1 OBESITY DUE TO EXCESS CALORIES WITH SERIOUS COMORBIDITY AND BODY MASS INDEX (BMI) OF 31.0 TO 31.9 IN ADULT: ICD-10-CM

## 2023-05-01 NOTE — ASSESSMENT & PLAN NOTE
Improving  I am going to increase Wegovy to 1 7 mg weekly  Discussed with possible side effect  Discussed about low-carb diet and regular exercise  We will continue to monitor

## 2023-05-01 NOTE — PROGRESS NOTES
Name: Yesica Patel      : 1970      MRN: [id number]  Encounter Provider: Shahnaz Germain MD  Encounter Date: 2023   Encounter department: [address] [place] [address]  Gastroesophageal reflux disease without esophagitis  Assessment & Plan:  Stable on pantoprazole  Continue same  We will continue to monitor  2  Class 1 obesity due to excess calories with serious comorbidity and body mass index (BMI) of 31 0 to 31 9 in adult  Assessment & Plan:  Improving  I am going to increase Wegovy to 1 7 mg weekly  Discussed with possible side effect  Discussed about low-carb diet and regular exercise  We will continue to monitor  Orders:  -     Semaglutide-Weight Management (WEGOVY) 1 7 MG/0 75ML; Inject 0 75 mL (1 7 mg total) under the skin once a week    3  Healthcare maintenance  Assessment & Plan:  Discussed about immunizations, diet, exercise and safety measures  4  Asymptomatic menopause  -     DXA bone density spine hip and pelvis; Future; Expected date: 2023    5  Encounter for screening mammogram for malignant neoplasm of breast  -     Mammo screening bilateral w 3d & cad; Future; Expected date: 2023           Subjective     Is here for follow-up for weight management  She has been taking her Wegovy 1 mg weekly  She lost 8 pounds since last visit  Total weight loss 20 pounds since she started on the medication  She denies she was having some nauseous feeling when she started on 1 mg but now she is feeling better  She continues on pantoprazole for GERD symptoms  Review of Systems   Constitutional: Negative for chills and fever  HENT: Negative for trouble swallowing  Eyes: Negative for visual disturbance  Respiratory: Negative for cough and shortness of breath  Cardiovascular: Negative for chest pain, palpitations and leg swelling  Gastrointestinal: Negative for abdominal pain, constipation and diarrhea     Endocrine: Negative for cold intolerance and heat intolerance  Genitourinary: Negative for difficulty urinating and dysuria  Musculoskeletal: Negative for gait problem  Skin: Negative for rash  Neurological: Negative for dizziness, tremors, seizures and headaches  Hematological: Negative for adenopathy  Psychiatric/Behavioral: Negative for behavioral problems         Past Medical History:   Diagnosis Date    Allergic     Anxiety     Asthma     GI symptoms 05/27/2020    Lactose intolerance      Past Surgical History:   Procedure Laterality Date    BACK SURGERY  2012    OVARIAN CYST REMOVAL Left 2008    UPPER GASTROINTESTINAL ENDOSCOPY  04/03/2007    Gastritis-biopsy negative for H  pylori by [facility]     Family History   Problem Relation Age of Onset    Hypertension Father     Hypertension Sister     Lung cancer Sister     Cancer Maternal Aunt     Breast cancer additional onset Cousin     Cancer Cousin      Social History     Socioeconomic History    Marital status: /Civil Union     Spouse name: None    Number of children: None    Years of education: None    Highest education level: None   Occupational History    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   Tobacco Use    Smoking status: Never    Smokeless tobacco: Never    Tobacco comments:     no passive smoke exposure   Vaping Use    Vaping Use: Never used   Substance and Sexual Activity    Alcohol use: Never    Drug use: Never    Sexual activity: None   Other Topics Concern    None   Social History Narrative    None     Social Determinants of Health     Financial Resource Strain: Not on file   Food Insecurity: Not on file   Transportation Needs: Not on file   Physical Activity: Not on file   Stress: Not on file   Social Connections: Not on file   Intimate Partner Violence: Not on file   Housing Stability: Not on file     Current Outpatient Medications on File Prior to Visit   Medication Sig    albuterol (PROVENTIL HFA,VENTOLIN HFA) 90 mcg/act inhaler albuterol sulfate HFA 90 mcg/actuation aerosol inhaler    buPROPion (Wellbutrin XL) 150 mg 24 hr tablet Take 1 tablet (150 mg total) by mouth every morning    cyclobenzaprine (FLEXERIL) 10 mg tablet TAKE 1 TABLET BY MOUTH DAILY AS NEEDED FOR MUSCLE SPASMS      fluticasone (FLONASE) 50 mcg/act nasal spray SPRAY 1 SPRAY INTO EACH NOSTRIL EVERY DAY    loratadine (CLARITIN) 10 mg tablet Take 10 mg by mouth daily    LORazepam (ATIVAN) 0 5 mg tablet Take 1 tablet (0 5 mg total) by mouth every 8 (eight) hours as needed for anxiety    montelukast (SINGULAIR) 10 mg tablet Take 10 mg by mouth daily at bedtime    ondansetron (ZOFRAN-ODT) 4 mg disintegrating tablet TAKE 1 TABLET (4 MG TOTAL) BY MOUTH EVERY 6 (SIX) HOURS AS NEEDED FOR NAUSEA OR VOMITING    pantoprazole (PROTONIX) 40 mg tablet Take 1 tablet (40 mg total) by mouth daily before breakfast    escitalopram (LEXAPRO) 10 mg tablet TAKE 1 AND 1/2 TABLETS DAILY BY MOUTH (Patient not taking: Reported on 5/1/2023)    hydrOXYzine HCL (ATARAX) 25 mg tablet Take 1 tablet (25 mg total) by mouth 2 (two) times a day as needed for itching or anxiety (Patient not taking: Reported on 5/1/2023)    hyoscyamine (LEVSIN/SL) 0 125 mg SL tablet Take 1 tablet (0 125 mg total) by mouth 2 (two) times a day    phenazopyridine (PYRIDIUM) 200 mg tablet Take 1 tablet (200 mg total) by mouth 3 (three) times a day with meals (Patient not taking: Reported on 5/13/2022)     Allergies   Allergen Reactions    Codeine GI Intolerance and Other (See Comments)    Hydrocodone-Acetaminophen Other (See Comments) and Headache     Immunization History   Administered Date(s) Administered    COVID-19 PFIZER VACCINE 0 3 ML IM 04/16/2021, 05/07/2021    Td (adult), Unspecified 09/30/2012    Tdap 09/30/2012       Objective     /78 (BP Location: Left arm, Patient Position: Sitting, Cuff Size: Standard)   Pulse 103   Temp 97 8 °F (36 6 °C) (Tympanic)   Resp 16   Ht 5' (1 524 m)   Wt 73 7 kg (162 lb 6 4 oz)   SpO2 99%   BMI 31 72 kg/m²     Physical Exam  Vitals and nursing note reviewed  Constitutional:       Appearance: Normal appearance  She is well-developed  HENT:      Head: Normocephalic and atraumatic  Eyes:      Pupils: Pupils are equal, round, and reactive to light  Cardiovascular:      Rate and Rhythm: Normal rate and regular rhythm  Heart sounds: Normal heart sounds  Pulmonary:      Effort: Pulmonary effort is normal       Breath sounds: Normal breath sounds  Abdominal:      General: Bowel sounds are normal       Palpations: Abdomen is soft  Musculoskeletal:         General: Normal range of motion  Cervical back: Normal range of motion and neck supple  Lymphadenopathy:      Cervical: No cervical adenopathy  Skin:     General: Skin is warm  Neurological:      Mental Status: She is alert and oriented to person, place, and time  Cranial Nerves: No cranial nerve deficit         Gregorio Meraz MD

## 2023-05-01 NOTE — PROGRESS NOTES
Name: Connor Bro      : 1970      MRN: [id number]  Encounter Provider: Priscilla Correia MD  Encounter Date: 2023   Encounter department: [address] [place] [address]  Gastroesophageal reflux disease without esophagitis  Assessment & Plan:  Stable on pantoprazole  Continue same  We will continue to monitor  2  Class 1 obesity due to excess calories with serious comorbidity and body mass index (BMI) of 31 0 to 31 9 in adult  Assessment & Plan:  Improving  I am going to increase Wegovy to 1 7 mg weekly  Discussed with possible side effect  Discussed about low-carb diet and regular exercise  We will continue to monitor  Orders:  -     Semaglutide-Weight Management (WEGOVY) 1 7 MG/0 75ML; Inject 0 75 mL (1 7 mg total) under the skin once a week    3  Healthcare maintenance  Assessment & Plan:  Discussed about immunizations, diet, exercise and safety measures  4  Asymptomatic menopause  -     DXA bone density spine hip and pelvis; Future; Expected date: 2023    5  Encounter for screening mammogram for malignant neoplasm of breast  -     Mammo screening bilateral w 3d & cad; Future; Expected date: 2023           Subjective     Is here for follow-up for weight management  She has been taking her Wegovy 1 mg weekly  She lost 8 pounds since last visit  Total weight loss 20 pounds since she started on the medication  She denies she was having some nauseous feeling when she started on 1 mg but now she is feeling better  She continues on pantoprazole for GERD symptoms  Review of Systems   Constitutional: Negative for chills and fever  HENT: Negative for trouble swallowing  Eyes: Negative for visual disturbance  Respiratory: Negative for cough and shortness of breath  Cardiovascular: Negative for chest pain, palpitations and leg swelling  Gastrointestinal: Negative for abdominal pain, constipation and diarrhea     Endocrine: Negative for cold intolerance and heat intolerance  Genitourinary: Negative for difficulty urinating and dysuria  Musculoskeletal: Negative for gait problem  Skin: Negative for rash  Neurological: Negative for dizziness, tremors, seizures and headaches  Hematological: Negative for adenopathy  Psychiatric/Behavioral: Negative for behavioral problems         Past Medical History:   Diagnosis Date    Allergic     Anxiety     Asthma     GI symptoms 05/27/2020    Lactose intolerance      Past Surgical History:   Procedure Laterality Date    BACK SURGERY  2012    OVARIAN CYST REMOVAL Left 2008    UPPER GASTROINTESTINAL ENDOSCOPY  04/03/2007    Gastritis-biopsy negative for H  pylori by EP GI     Family History   Problem Relation Age of Onset    Hypertension Father     Hypertension Sister     Lung cancer Sister     Cancer Maternal Aunt     Breast cancer additional onset Cousin     Cancer Cousin      Social History     Socioeconomic History    Marital status: /Civil Union     Spouse name: None    Number of children: None    Years of education: None    Highest education level: None   Occupational History    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[employer]   Tobacco Use    Smoking status: Never    Smokeless tobacco: Never    Tobacco comments:     no passive smoke exposure   Vaping Use    Vaping Use: Never used   Substance and Sexual Activity    Alcohol use: Never    Drug use: Never    Sexual activity: None   Other Topics Concern    None   Social History Narrative    None     Social Determinants of Health     Financial Resource Strain: Not on file   Food Insecurity: Not on file   Transportation Needs: Not on file   Physical Activity: Not on file   Stress: Not on file   Social Connections: Not on file   Intimate Partner Violence: Not on file   Housing Stability: Not on file     Current Outpatient Medications on File Prior to Visit   Medication Sig    albuterol (PROVENTIL HFA,VENTOLIN HFA) 90 mcg/act inhaler albuterol sulfate HFA 90 mcg/actuation aerosol inhaler    buPROPion (Wellbutrin XL) 150 mg 24 hr tablet Take 1 tablet (150 mg total) by mouth every morning    cyclobenzaprine (FLEXERIL) 10 mg tablet TAKE 1 TABLET BY MOUTH DAILY AS NEEDED FOR MUSCLE SPASMS      fluticasone (FLONASE) 50 mcg/act nasal spray SPRAY 1 SPRAY INTO EACH NOSTRIL EVERY DAY    loratadine (CLARITIN) 10 mg tablet Take 10 mg by mouth daily    LORazepam (ATIVAN) 0 5 mg tablet Take 1 tablet (0 5 mg total) by mouth every 8 (eight) hours as needed for anxiety    montelukast (SINGULAIR) 10 mg tablet Take 10 mg by mouth daily at bedtime    ondansetron (ZOFRAN-ODT) 4 mg disintegrating tablet TAKE 1 TABLET (4 MG TOTAL) BY MOUTH EVERY 6 (SIX) HOURS AS NEEDED FOR NAUSEA OR VOMITING    pantoprazole (PROTONIX) 40 mg tablet Take 1 tablet (40 mg total) by mouth daily before breakfast    escitalopram (LEXAPRO) 10 mg tablet TAKE 1 AND 1/2 TABLETS DAILY BY MOUTH (Patient not taking: Reported on 5/1/2023)    hydrOXYzine HCL (ATARAX) 25 mg tablet Take 1 tablet (25 mg total) by mouth 2 (two) times a day as needed for itching or anxiety (Patient not taking: Reported on 5/1/2023)    hyoscyamine (LEVSIN/SL) 0 125 mg SL tablet Take 1 tablet (0 125 mg total) by mouth 2 (two) times a day    phenazopyridine (PYRIDIUM) 200 mg tablet Take 1 tablet (200 mg total) by mouth 3 (three) times a day with meals (Patient not taking: Reported on 5/13/2022)     Allergies   Allergen Reactions    Codeine GI Intolerance and Other (See Comments)    Hydrocodone-Acetaminophen Other (See Comments) and Headache     Immunization History   Administered Date(s) Administered    COVID-19 PFIZER VACCINE 0 3 ML IM 04/16/2021, 05/07/2021    Td (adult), Unspecified 09/30/2012    Tdap 09/30/2012       Objective     /78 (BP Location: Left arm, Patient Position: Sitting, Cuff Size: Standard)   Pulse 103   Temp 97 8 °F (36 6 °C) (Tympanic)   Resp 16   Ht 5' (1 524 m)   Wt 73 7 kg (162 lb 6 4 oz)   SpO2 99%   BMI 31 72 kg/m²     Physical Exam  Vitals and nursing note reviewed  Constitutional:       Appearance: Normal appearance  She is well-developed  HENT:      Head: Normocephalic and atraumatic  Eyes:      Pupils: Pupils are equal, round, and reactive to light  Cardiovascular:      Rate and Rhythm: Normal rate and regular rhythm  Heart sounds: Normal heart sounds  Pulmonary:      Effort: Pulmonary effort is normal       Breath sounds: Normal breath sounds  Abdominal:      General: Bowel sounds are normal       Palpations: Abdomen is soft  Musculoskeletal:         General: Normal range of motion  Cervical back: Normal range of motion and neck supple  Lymphadenopathy:      Cervical: No cervical adenopathy  Skin:     General: Skin is warm  Neurological:      Mental Status: She is alert and oriented to person, place, and time  Cranial Nerves: No cranial nerve deficit         Marli Beltran MD

## 2023-05-25 DIAGNOSIS — F33.9 DEPRESSION, RECURRENT (HCC): ICD-10-CM

## 2023-05-25 RX ORDER — BUPROPION HYDROCHLORIDE 150 MG/1
150 TABLET ORAL EVERY MORNING
Qty: 90 TABLET | Refills: 1 | Status: SHIPPED | OUTPATIENT
Start: 2023-05-25 | End: 2023-05-26

## 2023-05-26 ENCOUNTER — TELEPHONE (OUTPATIENT)
Dept: FAMILY MEDICINE CLINIC | Facility: CLINIC | Age: 53
End: 2023-05-26

## 2023-05-26 RX ORDER — BUPROPION HYDROCHLORIDE 150 MG/1
TABLET ORAL
Qty: 90 TABLET | Refills: 1 | Status: SHIPPED | OUTPATIENT
Start: 2023-05-26 | End: 2023-05-30 | Stop reason: SDUPTHER

## 2023-05-26 NOTE — TELEPHONE ENCOUNTER
Patient left a message asking for her dexa scan and mammogram orders be faxed to [facility] at [phone]    Notified patient via my chart

## 2023-05-30 ENCOUNTER — OFFICE VISIT (OUTPATIENT)
Dept: FAMILY MEDICINE CLINIC | Facility: CLINIC | Age: 53
End: 2023-05-30

## 2023-05-30 VITALS
HEIGHT: 60 IN | DIASTOLIC BLOOD PRESSURE: 80 MMHG | WEIGHT: 150 LBS | OXYGEN SATURATION: 98 % | TEMPERATURE: 97.6 F | SYSTOLIC BLOOD PRESSURE: 114 MMHG | RESPIRATION RATE: 16 BRPM | BODY MASS INDEX: 29.45 KG/M2 | HEART RATE: 104 BPM

## 2023-05-30 DIAGNOSIS — E66.09 CLASS 1 OBESITY DUE TO EXCESS CALORIES WITH SERIOUS COMORBIDITY AND BODY MASS INDEX (BMI) OF 31.0 TO 31.9 IN ADULT: ICD-10-CM

## 2023-05-30 DIAGNOSIS — F33.9 DEPRESSION, RECURRENT (HCC): ICD-10-CM

## 2023-05-30 DIAGNOSIS — F33.2 MDD (MAJOR DEPRESSIVE DISORDER), RECURRENT SEVERE, WITHOUT PSYCHOSIS (HCC): ICD-10-CM

## 2023-05-30 DIAGNOSIS — F41.9 ANXIETY: ICD-10-CM

## 2023-05-30 DIAGNOSIS — R11.0 NAUSEA: Primary | ICD-10-CM

## 2023-05-30 PROBLEM — G44.89 OTHER HEADACHE SYNDROME: Status: ACTIVE | Noted: 2023-05-30

## 2023-05-30 PROBLEM — G44.89 OTHER HEADACHE SYNDROME: Status: RESOLVED | Noted: 2023-05-30 | Resolved: 2023-05-30

## 2023-05-30 RX ORDER — ONDANSETRON 4 MG/1
4 TABLET, ORALLY DISINTEGRATING ORAL EVERY 6 HOURS PRN
Qty: 24 TABLET | Refills: 1 | Status: SHIPPED | OUTPATIENT
Start: 2023-05-30

## 2023-05-30 RX ORDER — BUPROPION HYDROCHLORIDE 150 MG/1
150 TABLET ORAL EVERY MORNING
Qty: 90 TABLET | Refills: 1 | Status: SHIPPED | OUTPATIENT
Start: 2023-05-30

## 2023-05-30 NOTE — ASSESSMENT & PLAN NOTE
Improving  Continue on Wegovy but I am going to increase to 2 4 mg weekly  Discussed with possible side effect  Discussed about low-carb diet and regular exercise  Come back in 1 month for follow-up

## 2023-05-30 NOTE — PROGRESS NOTES
Name: Nicolas Matamoros      : 1970      MRN: [id number]  Encounter Provider: Howard Bolanos MD  Encounter Date: 2023   Encounter department: [address] [place] [address]  Nausea  Assessment & Plan:  She was given prescription for Zofran  We will continue to monitor  Orders:  -     ondansetron (ZOFRAN-ODT) 4 mg disintegrating tablet; Take 1 tablet (4 mg total) by mouth every 6 (six) hours as needed for nausea or vomiting    2  MDD (major depressive disorder), recurrent severe, without psychosis ([facility] [address] )  Assessment & Plan:  Well-controlled  Continue same  We will continue to monitor  3  Depression, recurrent ([facility] [address] )  Assessment & Plan:  Stable on Wellbutrin  Continue same  We will continue to monitor  She was given refill  Orders:  -     buPROPion (WELLBUTRIN XL) 150 mg 24 hr tablet; Take 1 tablet (150 mg total) by mouth every morning    4  Anxiety  Assessment & Plan:  Stable on her medication  She takes Ativan only as needed  5  Class 1 obesity due to excess calories with serious comorbidity and body mass index (BMI) of 31 0 to 31 9 in adult  Assessment & Plan:  Improving  Continue on Wegovy but I am going to increase to 2 4 mg weekly  Discussed with possible side effect  Discussed about low-carb diet and regular exercise  Come back in 1 month for follow-up  Orders:  -     Semaglutide-Weight Management (WEGOVY) 2 4 MG/0 75ML; Inject 0 75 mL (2 4 mg total) under the skin once a week    6  BMI 29 0-29 9,adult  Assessment & Plan:  Improving  Continue on Wegovy but I am going to increase to 2 4 mg weekly  Discussed with possible side effect  Discussed about low-carb diet and regular exercise  Come back in 1 month for follow-up  Subjective     She is here today for follow-up multiple medical problems and for weight management  She has been using Wegovy 1 7 mg weekly  She lost 12 pounds since her last visit    She denies any side effect than nauseous feeling on the first 2 days when she uses the medicine  She is doing well with her depression  She continues on Wellbutrin  She uses Ativan only as needed  Review of Systems   Constitutional: Negative for chills and fever  HENT: Negative for trouble swallowing  Eyes: Negative for visual disturbance  Respiratory: Negative for cough and shortness of breath  Cardiovascular: Negative for chest pain, palpitations and leg swelling  Gastrointestinal: Negative for abdominal pain, constipation and diarrhea  Endocrine: Negative for cold intolerance and heat intolerance  Genitourinary: Negative for difficulty urinating and dysuria  Musculoskeletal: Negative for gait problem  Skin: Negative for rash  Neurological: Negative for dizziness, tremors, seizures and headaches  Hematological: Negative for adenopathy  Psychiatric/Behavioral: Negative for behavioral problems         Past Medical History:   Diagnosis Date   • Allergic    • Anxiety    • Asthma    • GI symptoms 05/27/2020   • Lactose intolerance      Past Surgical History:   Procedure Laterality Date   • BACK SURGERY  2012   • OVARIAN CYST REMOVAL Left 2008   • UPPER GASTROINTESTINAL ENDOSCOPY  04/03/2007    Gastritis-biopsy negative for H  pylori by [facility]     Family History   Problem Relation Age of Onset   • Hypertension Father    • Hypertension Sister    • Lung cancer Sister    • Cancer Maternal Aunt    • Breast cancer additional onset Cousin    • Cancer Cousin      Social History     Socioeconomic History   • Marital status: /Civil Union     Spouse name: None   • Number of children: None   • Years of education: None   • Highest education level: None   Occupational History   • 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[employer]   Tobacco Use   • Smoking status: Never   • Smokeless tobacco: Never   • Tobacco comments:     no passive smoke exposure   Vaping Use   • Vaping Use: Never used   Substance and Sexual Activity   • Alcohol use: Never • Drug use: Never   • Sexual activity: None   Other Topics Concern   • None   Social History Narrative   • None     Social Determinants of Health     Financial Resource Strain: Not on file   Food Insecurity: Not on file   Transportation Needs: Not on file   Physical Activity: Not on file   Stress: Not on file   Social Connections: Not on file   Intimate Partner Violence: Not on file   Housing Stability: Not on file     Current Outpatient Medications on File Prior to Visit   Medication Sig   • albuterol (PROVENTIL HFA,VENTOLIN HFA) 90 mcg/act inhaler albuterol sulfate HFA 90 mcg/actuation aerosol inhaler   • cyclobenzaprine (FLEXERIL) 10 mg tablet TAKE 1 TABLET BY MOUTH DAILY AS NEEDED FOR MUSCLE SPASMS     • fluticasone (FLONASE) 50 mcg/act nasal spray SPRAY 1 SPRAY INTO EACH NOSTRIL EVERY DAY   • loratadine (CLARITIN) 10 mg tablet Take 10 mg by mouth daily   • LORazepam (ATIVAN) 0 5 mg tablet Take 1 tablet (0 5 mg total) by mouth every 8 (eight) hours as needed for anxiety   • montelukast (SINGULAIR) 10 mg tablet Take 10 mg by mouth daily at bedtime   • pantoprazole (PROTONIX) 40 mg tablet Take 1 tablet (40 mg total) by mouth daily before breakfast   • [DISCONTINUED] buPROPion (WELLBUTRIN XL) 150 mg 24 hr tablet TAKE 1 TABLET BY MOUTH EVERY DAY IN THE MORNING   • [DISCONTINUED] ondansetron (ZOFRAN-ODT) 4 mg disintegrating tablet TAKE 1 TABLET (4 MG TOTAL) BY MOUTH EVERY 6 (SIX) HOURS AS NEEDED FOR NAUSEA OR VOMITING   • [DISCONTINUED] Semaglutide-Weight Management (WEGOVY) 1 7 MG/0 75ML Inject 0 75 mL (1 7 mg total) under the skin once a week   • hydrOXYzine HCL (ATARAX) 25 mg tablet Take 1 tablet (25 mg total) by mouth 2 (two) times a day as needed for itching or anxiety (Patient not taking: Reported on 5/1/2023)   • hyoscyamine (LEVSIN/SL) 0 125 mg SL tablet Take 1 tablet (0 125 mg total) by mouth 2 (two) times a day   • phenazopyridine (PYRIDIUM) 200 mg tablet Take 1 tablet (200 mg total) by mouth 3 (three) times a day with meals (Patient not taking: Reported on 5/13/2022)   • [DISCONTINUED] escitalopram (LEXAPRO) 10 mg tablet TAKE 1 AND 1/2 TABLETS DAILY BY MOUTH (Patient not taking: Reported on 5/1/2023)     Allergies   Allergen Reactions   • Codeine GI Intolerance and Other (See Comments)   • Hydrocodone-Acetaminophen Other (See Comments) and Headache     Immunization History   Administered Date(s) Administered   • COVID-19 PFIZER VACCINE 0 3 ML IM 04/16/2021, 05/07/2021   • Td (adult), Unspecified 09/30/2012   • Tdap 09/30/2012       Objective     /80 (BP Location: Left arm, Patient Position: Sitting, Cuff Size: Large)   Pulse 104   Temp 97 6 °F (36 4 °C) (Tympanic)   Resp 16   Ht 5' (1 524 m)   Wt 68 kg (150 lb)   SpO2 98%   BMI 29 29 kg/m²     Physical Exam  Vitals and nursing note reviewed  Constitutional:       Appearance: She is well-developed  HENT:      Head: Normocephalic and atraumatic  Cardiovascular:      Rate and Rhythm: Normal rate and regular rhythm  Heart sounds: Normal heart sounds  Pulmonary:      Effort: Pulmonary effort is normal       Breath sounds: Normal breath sounds  Abdominal:      General: Bowel sounds are normal       Palpations: Abdomen is soft  Musculoskeletal:         General: Normal range of motion  Cervical back: Normal range of motion and neck supple  Lymphadenopathy:      Cervical: No cervical adenopathy  Skin:     General: Skin is warm  Neurological:      Mental Status: She is alert and oriented to person, place, and time  Cranial Nerves: No cranial nerve deficit         Rafael Paulson MD

## 2023-06-26 ENCOUNTER — TELEPHONE (OUTPATIENT)
Dept: FAMILY MEDICINE CLINIC | Facility: CLINIC | Age: 53
End: 2023-06-26

## 2023-06-26 NOTE — TELEPHONE ENCOUNTER
Pt l/m stating she has severe allergies, PND, Dry heaves  Pt has tried Cheko Ramos, claritin, Manuel American  Returned call to PT , l/m for pt to call office

## 2023-06-26 NOTE — TELEPHONE ENCOUNTER
Pt returned call  States has occasional runny nose but her PND is worse in afternoon and makes her very nauseous  No cough or chest congestion  Zyrtec makes pt very tired  Claritin , flonase , benadryl not working  Please advise   Pt declined appt       Pt uses [facility]\  Allergies  to codeine, hydrocodone-acetainophen

## 2023-06-28 DIAGNOSIS — J01.00 ACUTE NON-RECURRENT MAXILLARY SINUSITIS: Primary | ICD-10-CM

## 2023-06-28 RX ORDER — AZITHROMYCIN 250 MG/1
TABLET, FILM COATED ORAL
Qty: 6 TABLET | Refills: 0 | Status: SHIPPED | OUTPATIENT
Start: 2023-06-28 | End: 2023-07-02

## 2023-06-28 RX ORDER — PREDNISONE 50 MG/1
50 TABLET ORAL DAILY
Qty: 5 TABLET | Refills: 0 | Status: SHIPPED | OUTPATIENT
Start: 2023-06-28 | End: 2023-07-03 | Stop reason: ALTCHOICE

## 2023-06-28 RX ORDER — FEXOFENADINE HCL 180 MG/1
180 TABLET ORAL DAILY
Qty: 30 TABLET | Refills: 3 | Status: SHIPPED | OUTPATIENT
Start: 2023-06-28 | End: 2023-07-20

## 2023-06-28 RX ORDER — AZELASTINE 1 MG/ML
1 SPRAY, METERED NASAL 2 TIMES DAILY
Qty: 30 ML | Refills: 1 | Status: SHIPPED | OUTPATIENT
Start: 2023-06-28 | End: 2023-08-30

## 2023-06-28 NOTE — TELEPHONE ENCOUNTER
Lm for pt that medication was sent to pharmacy along with a medication to take daily after the prescription is finished  Pt is to view this in her my chart or call with any questions

## 2023-06-28 NOTE — TELEPHONE ENCOUNTER
I sent the prescriptions for Z-Osmany and prednisone to be taken for 5 days and then after she is done she can start on Allegra and Astelin nasal spray  She is also continue on the Flonase nasal spray

## 2023-06-29 ENCOUNTER — RA CDI HCC (OUTPATIENT)
Dept: OTHER | Facility: HOSPITAL | Age: 53
End: 2023-06-29

## 2023-06-30 PROBLEM — Z00.00 HEALTHCARE MAINTENANCE: Status: RESOLVED | Noted: 2022-05-13 | Resolved: 2023-06-30

## 2023-07-02 ENCOUNTER — OFFICE VISIT (OUTPATIENT)
Dept: URGENT CARE | Age: 53
End: 2023-07-02
Payer: COMMERCIAL

## 2023-07-02 VITALS
HEIGHT: 60 IN | SYSTOLIC BLOOD PRESSURE: 109 MMHG | RESPIRATION RATE: 18 BRPM | WEIGHT: 143 LBS | HEART RATE: 93 BPM | DIASTOLIC BLOOD PRESSURE: 76 MMHG | TEMPERATURE: 98.4 F | BODY MASS INDEX: 28.07 KG/M2 | OXYGEN SATURATION: 98 %

## 2023-07-02 DIAGNOSIS — R09.82 PND (POST-NASAL DRIP): ICD-10-CM

## 2023-07-02 DIAGNOSIS — N30.91 CYSTITIS WITH HEMATURIA: Primary | ICD-10-CM

## 2023-07-02 LAB
SL AMB  POCT GLUCOSE, UA: ABNORMAL
SL AMB LEUKOCYTE ESTERASE,UA: ABNORMAL
SL AMB POCT BILIRUBIN,UA: ABNORMAL
SL AMB POCT BLOOD,UA: ABNORMAL
SL AMB POCT CLARITY,UA: ABNORMAL
SL AMB POCT COLOR,UA: YELLOW
SL AMB POCT KETONES,UA: ABNORMAL
SL AMB POCT NITRITE,UA: ABNORMAL
SL AMB POCT PH,UA: 6.5
SL AMB POCT SPECIFIC GRAVITY,UA: 1.01
SL AMB POCT URINE PROTEIN: ABNORMAL
SL AMB POCT UROBILINOGEN: ABNORMAL

## 2023-07-02 PROCEDURE — 81002 URINALYSIS NONAUTO W/O SCOPE: CPT

## 2023-07-02 PROCEDURE — 87086 URINE CULTURE/COLONY COUNT: CPT

## 2023-07-02 PROCEDURE — 99213 OFFICE O/P EST LOW 20 MIN: CPT

## 2023-07-02 RX ORDER — NITROFURANTOIN 25; 75 MG/1; MG/1
100 CAPSULE ORAL 2 TIMES DAILY
Qty: 10 CAPSULE | Refills: 0 | Status: SHIPPED | OUTPATIENT
Start: 2023-07-02 | End: 2023-07-07

## 2023-07-02 RX ORDER — PHENAZOPYRIDINE HYDROCHLORIDE 200 MG/1
200 TABLET, FILM COATED ORAL
Qty: 10 TABLET | Refills: 0 | Status: SHIPPED | OUTPATIENT
Start: 2023-07-02

## 2023-07-02 NOTE — PROGRESS NOTES
[facility]        NAME: Brittni Miguel is a 45 y.o. female  : 1970    MRN: [id number]  DATE: 2023  TIME: 8:55 AM    Assessment and Plan   Cystitis with hematuria [N30.91]  1. Cystitis with hematuria  POCT urine dip    Urine culture    nitrofurantoin (MACROBID) 100 mg capsule    phenazopyridine (PYRIDIUM) 200 mg tablet        Pt presents with UTI symptoms since yesterday. Frequency, pressure, suprapubic pain. Denies fevers, denies flank pain, n/n. POCt urine notes leuks, protein and blood. Will send culture. Patient Instructions       Follow up with PCP as needed    Chief Complaint     Chief Complaint   Patient presents with   • Possible UTI     Urinary frequency, pain with urination, pelvic pressure began yesterday         History of Present Illness       Pt presents with UTI symptoms since yesterday. Frequency, pressure, suprapubic pain. Denies fevers, denies flank pain, n/n. POCt urine notes leuks, protein and blood. Will send culture. Review of Systems   Review of Systems   Constitutional: Negative for activity change and fever. Gastrointestinal: Positive for abdominal pain. Genitourinary: Positive for dysuria, frequency and urgency. All other systems reviewed and are negative.         Current Medications       Current Outpatient Medications:   •  azelastine (ASTELIN) 0.1 % nasal spray, 1 spray into each nostril 2 (two) times a day Use in each nostril as directed, Disp: 30 mL, Rfl: 1  •  azithromycin (ZITHROMAX) 250 mg tablet, Take 2 tablets today then 1 tablet daily x 4 days, Disp: 6 tablet, Rfl: 0  •  buPROPion (WELLBUTRIN XL) 150 mg 24 hr tablet, Take 1 tablet (150 mg total) by mouth every morning, Disp: 90 tablet, Rfl: 1  •  cyclobenzaprine (FLEXERIL) 10 mg tablet, TAKE 1 TABLET BY MOUTH DAILY AS NEEDED FOR MUSCLE SPASMS., Disp: 30 tablet, Rfl: 0  •  fexofenadine (ALLEGRA) 180 MG tablet, Take 1 tablet (180 mg total) by mouth daily, Disp: 30 tablet, Rfl: 3  •  hyoscyamine (LEVSIN/SL) 0.125 mg SL tablet, Take 1 tablet (0.125 mg total) by mouth 2 (two) times a day, Disp: 180 tablet, Rfl: 3  •  nitrofurantoin (MACROBID) 100 mg capsule, Take 1 capsule (100 mg total) by mouth 2 (two) times a day for 5 days, Disp: 10 capsule, Rfl: 0  •  pantoprazole (PROTONIX) 40 mg tablet, Take 1 tablet (40 mg total) by mouth daily before breakfast, Disp: 90 tablet, Rfl: 1  •  phenazopyridine (PYRIDIUM) 200 mg tablet, Take 1 tablet (200 mg total) by mouth 3 (three) times a day with meals, Disp: 10 tablet, Rfl: 0  •  Semaglutide-Weight Management (WEGOVY) 2.4 MG/0.75ML, Inject 0.75 mL (2.4 mg total) under the skin once a week, Disp: 3 mL, Rfl: 3  •  albuterol (PROVENTIL HFA,VENTOLIN HFA) 90 mcg/act inhaler, albuterol sulfate HFA 90 mcg/actuation aerosol inhaler, Disp: , Rfl:   •  fluticasone (FLONASE) 50 mcg/act nasal spray, SPRAY 1 SPRAY INTO EACH NOSTRIL EVERY DAY, Disp: 16 mL, Rfl: 0  •  hydrOXYzine HCL (ATARAX) 25 mg tablet, Take 1 tablet (25 mg total) by mouth 2 (two) times a day as needed for itching or anxiety (Patient not taking: Reported on 5/1/2023), Disp: 180 tablet, Rfl: 1  •  loratadine (CLARITIN) 10 mg tablet, Take 10 mg by mouth daily (Patient not taking: Reported on 7/2/2023), Disp: , Rfl:   •  LORazepam (ATIVAN) 0.5 mg tablet, Take 1 tablet (0.5 mg total) by mouth every 8 (eight) hours as needed for anxiety (Patient not taking: Reported on 7/2/2023), Disp: 30 tablet, Rfl: 0  •  montelukast (SINGULAIR) 10 mg tablet, Take 10 mg by mouth daily at bedtime (Patient not taking: Reported on 7/2/2023), Disp: , Rfl:   •  ondansetron (ZOFRAN-ODT) 4 mg disintegrating tablet, Take 1 tablet (4 mg total) by mouth every 6 (six) hours as needed for nausea or vomiting (Patient not taking: Reported on 7/2/2023), Disp: 24 tablet, Rfl: 1  •  predniSONE 50 mg tablet, Take 1 tablet (50 mg total) by mouth daily for 5 days (Patient not taking: Reported on 7/2/2023), Disp: 5 tablet, Rfl: 0    Current Allergies Allergies as of 07/02/2023 - Reviewed 07/02/2023   Allergen Reaction Noted   • Codeine GI Intolerance and Other (See Comments) 07/14/2014   • Hydrocodone-acetaminophen Other (See Comments) and Headache 11/05/2015            The following portions of the patient's history were reviewed and updated as appropriate: allergies, current medications, past family history, past medical history, past social history, past surgical history and problem list.     Past Medical History:   Diagnosis Date   • Allergic    • Anxiety    • Asthma    • GI symptoms 05/27/2020   • Lactose intolerance        Past Surgical History:   Procedure Laterality Date   • BACK SURGERY  2012   • OVARIAN CYST REMOVAL Left 2008   • UPPER GASTROINTESTINAL ENDOSCOPY  04/03/2007    Gastritis-biopsy negative for H. pylori by [facility]       Family History   Problem Relation Age of Onset   • Hypertension Father    • Hypertension Sister    • Lung cancer Sister    • Cancer Maternal Aunt    • Breast cancer additional onset Cousin    • Cancer Cousin          Medications have been verified. Objective   /76   Pulse 93   Temp 98.4 °F (36.9 °C)   Resp 18   Ht 5' (1.524 m)   Wt 64.9 kg (143 lb)   SpO2 98%   BMI 27.93 kg/m²   No LMP recorded. Physical Exam     Physical Exam  Vitals reviewed. Constitutional:       Appearance: Normal appearance. Abdominal:      Tenderness: There is no right CVA tenderness or left CVA tenderness. Neurological:      General: No focal deficit present. Mental Status: She is alert.

## 2023-07-03 ENCOUNTER — OFFICE VISIT (OUTPATIENT)
Dept: FAMILY MEDICINE CLINIC | Facility: CLINIC | Age: 53
End: 2023-07-03
Payer: COMMERCIAL

## 2023-07-03 VITALS
TEMPERATURE: 97.5 F | DIASTOLIC BLOOD PRESSURE: 78 MMHG | RESPIRATION RATE: 16 BRPM | WEIGHT: 141.6 LBS | HEART RATE: 94 BPM | HEIGHT: 60 IN | BODY MASS INDEX: 27.8 KG/M2 | OXYGEN SATURATION: 100 % | SYSTOLIC BLOOD PRESSURE: 122 MMHG

## 2023-07-03 DIAGNOSIS — J45.20 MILD INTERMITTENT ASTHMA WITHOUT COMPLICATION: ICD-10-CM

## 2023-07-03 DIAGNOSIS — K21.9 GASTROESOPHAGEAL REFLUX DISEASE WITHOUT ESOPHAGITIS: Primary | ICD-10-CM

## 2023-07-03 DIAGNOSIS — E66.09 CLASS 1 OBESITY DUE TO EXCESS CALORIES WITH SERIOUS COMORBIDITY AND BODY MASS INDEX (BMI) OF 31.0 TO 31.9 IN ADULT: ICD-10-CM

## 2023-07-03 DIAGNOSIS — E78.49 OTHER HYPERLIPIDEMIA: ICD-10-CM

## 2023-07-03 DIAGNOSIS — F41.9 ANXIETY: ICD-10-CM

## 2023-07-03 DIAGNOSIS — F33.2 MDD (MAJOR DEPRESSIVE DISORDER), RECURRENT SEVERE, WITHOUT PSYCHOSIS (HCC): ICD-10-CM

## 2023-07-03 DIAGNOSIS — R35.0 URINARY FREQUENCY: ICD-10-CM

## 2023-07-03 PROCEDURE — 99214 OFFICE O/P EST MOD 30 MIN: CPT | Performed by: FAMILY MEDICINE

## 2023-07-03 RX ORDER — SEMAGLUTIDE 2.4 MG/.75ML
INJECTION, SOLUTION SUBCUTANEOUS
Qty: 3 ML | Refills: 3 | OUTPATIENT
Start: 2023-07-03

## 2023-07-03 RX ORDER — FLUTICASONE PROPIONATE 50 MCG
SPRAY, SUSPENSION (ML) NASAL
Qty: 24 ML | Refills: 1 | Status: SHIPPED | OUTPATIENT
Start: 2023-07-03

## 2023-07-03 NOTE — ASSESSMENT & PLAN NOTE
Improving on Wegovy 2.4 mg weekly. Continue same. Discussed about low-carb diet and regular exercise. She was told to increase protein in her diet. Come back in 3 months for follow-up.

## 2023-07-03 NOTE — PROGRESS NOTES
Name: Brittni Miguel      : 1970      MRN: [id number]  Encounter Provider: Lilia Manley MD  Encounter Date: 7/3/2023   Encounter department: [facility]     1. Gastroesophageal reflux disease without esophagitis  Assessment & Plan:  Fair control on pantoprazole 40 mg daily. Continue same. We will continue to monitor. 2. Other hyperlipidemia  -     CBC and differential; Future  -     Comprehensive metabolic panel; Future  -     Lipid Panel with Direct LDL reflex; Future  -     TSH, 3rd generation with Free T4 reflex; Future    3. Class 1 obesity due to excess calories with serious comorbidity and body mass index (BMI) of 31.0 to 31.9 in adult  -     Semaglutide-Weight Management (WEGOVY) 2.4 MG/0.75ML; Inject 0.75 mL (2.4 mg total) under the skin once a week    4. MDD (major depressive disorder), recurrent severe, without psychosis ([address])  Assessment & Plan:  Well-controlled on Wellbutrin. Continue same. We will continue to monitor. 5. Mild intermittent asthma without complication  Assessment & Plan:  Stable. Continue same. We will continue to monitor. 6. Anxiety  Assessment & Plan:  Stable. Continue same. We will continue to monitor. 7. BMI 27.0-27.9,adult  Assessment & Plan:  Improving on Wegovy 2.4 mg weekly. Continue same. Discussed about low-carb diet and regular exercise. She was told to increase protein in her diet. Come back in 3 months for follow-up. 8. Urinary frequency  Assessment & Plan:  She was treated with Macrobid for possible UTI. I will follow-up with culture results. Subjective     Multiple medical problems. She has been using Wegovy 2.4 mg weekly. She lost 9 pounds at the last visit. Total weight loss since started on Wegovy in January is 40 pounds. She continues to have some problems with feeling nauseous.   She was seen in urgent care yesterday for urinary frequency and urgency was diagnosed with UTI was given prescription for Macrobid. Review of Systems   Constitutional: Negative for chills and fever. HENT: Negative for trouble swallowing. Eyes: Negative for visual disturbance. Respiratory: Negative for cough and shortness of breath. Cardiovascular: Negative for chest pain, palpitations and leg swelling. Gastrointestinal: Negative for abdominal pain, constipation and diarrhea. Endocrine: Negative for cold intolerance and heat intolerance. Genitourinary: Positive for dysuria and frequency. Negative for difficulty urinating. Musculoskeletal: Negative for gait problem. Skin: Negative for rash. Neurological: Negative for dizziness, tremors, seizures and headaches. Hematological: Negative for adenopathy. Psychiatric/Behavioral: Negative for behavioral problems.        Past Medical History:   Diagnosis Date   • Allergic    • Anxiety    • Asthma    • GI symptoms 05/27/2020   • Lactose intolerance      Past Surgical History:   Procedure Laterality Date   • BACK SURGERY  2012   • OVARIAN CYST REMOVAL Left 2008   • UPPER GASTROINTESTINAL ENDOSCOPY  04/03/2007    Gastritis-biopsy negative for H. pylori by [facility]     Family History   Problem Relation Age of Onset   • Hypertension Father    • Hypertension Sister    • Lung cancer Sister    • Cancer Maternal Aunt    • Breast cancer additional onset Cousin    • Cancer Cousin      Social History     Socioeconomic History   • Marital status: /Civil Union     Spouse name: None   • Number of children: None   • Years of education: None   • Highest education level: None   Occupational History   • 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able   Tobacco Use   • Smoking status: Never   • Smokeless tobacco: Never   • Tobacco comments:     no passive smoke exposure   Vaping Use   • Vaping Use: Never used   Substance and Sexual Activity   • Alcohol use: Never   • Drug use: Never   • Sexual activity: None   Other Topics Concern   • None   Social History Narrative   • None     Social Determinants of Health     Financial Resource Strain: Not on file   Food Insecurity: Not on file   Transportation Needs: Not on file   Physical Activity: Not on file   Stress: Not on file   Social Connections: Not on file   Intimate Partner Violence: Not on file   Housing Stability: Not on file     Current Outpatient Medications on File Prior to Visit   Medication Sig   • albuterol (PROVENTIL HFA,VENTOLIN HFA) 90 mcg/act inhaler albuterol sulfate HFA 90 mcg/actuation aerosol inhaler   • buPROPion (WELLBUTRIN XL) 150 mg 24 hr tablet Take 1 tablet (150 mg total) by mouth every morning   • cyclobenzaprine (FLEXERIL) 10 mg tablet TAKE 1 TABLET BY MOUTH DAILY AS NEEDED FOR MUSCLE SPASMS.    • fexofenadine (ALLEGRA) 180 MG tablet Take 1 tablet (180 mg total) by mouth daily   • nitrofurantoin (MACROBID) 100 mg capsule Take 1 capsule (100 mg total) by mouth 2 (two) times a day for 5 days   • ondansetron (ZOFRAN-ODT) 4 mg disintegrating tablet Take 1 tablet (4 mg total) by mouth every 6 (six) hours as needed for nausea or vomiting   • pantoprazole (PROTONIX) 40 mg tablet Take 1 tablet (40 mg total) by mouth daily before breakfast   • phenazopyridine (PYRIDIUM) 200 mg tablet Take 1 tablet (200 mg total) by mouth 3 (three) times a day with meals   • [DISCONTINUED] Semaglutide-Weight Management (WEGOVY) 2.4 MG/0.75ML Inject 0.75 mL (2.4 mg total) under the skin once a week   • azelastine (ASTELIN) 0.1 % nasal spray 1 spray into each nostril 2 (two) times a day Use in each nostril as directed (Patient not taking: Reported on 7/3/2023)   • [] azithromycin (ZITHROMAX) 250 mg tablet Take 2 tablets today then 1 tablet daily x 4 days   • fluticasone (FLONASE) 50 mcg/act nasal spray SPRAY 1 SPRAY INTO EACH NOSTRIL EVERY DAY (Patient not taking: Reported on 7/3/2023)   • hydrOXYzine HCL (ATARAX) 25 mg tablet Take 1 tablet (25 mg total) by mouth 2 (two) times a day as needed for itching or anxiety (Patient not taking: Reported on 5/1/2023)   • hyoscyamine (LEVSIN/SL) 0.125 mg SL tablet Take 1 tablet (0.125 mg total) by mouth 2 (two) times a day   • LORazepam (ATIVAN) 0.5 mg tablet Take 1 tablet (0.5 mg total) by mouth every 8 (eight) hours as needed for anxiety (Patient not taking: Reported on 7/2/2023)   • montelukast (SINGULAIR) 10 mg tablet Take 10 mg by mouth daily at bedtime (Patient not taking: Reported on 7/2/2023)   • [DISCONTINUED] fluticasone (FLONASE) 50 mcg/act nasal spray SPRAY 1 SPRAY INTO EACH NOSTRIL EVERY DAY   • [DISCONTINUED] loratadine (CLARITIN) 10 mg tablet Take 10 mg by mouth daily   • [DISCONTINUED] predniSONE 50 mg tablet Take 1 tablet (50 mg total) by mouth daily for 5 days     Allergies   Allergen Reactions   • Codeine GI Intolerance and Other (See Comments)   • Hydrocodone-Acetaminophen Other (See Comments) and Headache     Immunization History   Administered Date(s) Administered   • COVID-19 PFIZER VACCINE 0.3 ML IM 04/16/2021, 05/07/2021   • Td (adult), Unspecified 09/30/2012   • Tdap 09/30/2012       Objective     /78 (BP Location: Left arm, Patient Position: Sitting, Cuff Size: Adult)   Pulse 94   Temp 97.5 °F (36.4 °C) (Tympanic)   Resp 16   Ht 5' (1.524 m)   Wt 64.2 kg (141 lb 9.6 oz)   SpO2 100%   BMI 27.65 kg/m²     Physical Exam  Vitals and nursing note reviewed. Constitutional:       Appearance: She is well-developed. HENT:      Head: Normocephalic and atraumatic. Eyes:      Pupils: Pupils are equal, round, and reactive to light. Cardiovascular:      Rate and Rhythm: Normal rate and regular rhythm. Heart sounds: Normal heart sounds. Pulmonary:      Effort: Pulmonary effort is normal.      Breath sounds: Normal breath sounds. Abdominal:      General: Bowel sounds are normal.      Palpations: Abdomen is soft. Musculoskeletal:         General: Normal range of motion. Cervical back: Normal range of motion and neck supple. Lymphadenopathy:      Cervical: No cervical adenopathy. Skin:     General: Skin is warm. Neurological:      Mental Status: She is alert and oriented to person, place, and time. Cranial Nerves: No cranial nerve deficit.        Laurel Valladares MD

## 2023-07-03 NOTE — TELEPHONE ENCOUNTER
Per [facility] they are not caring wegovy.  [facility]MYNOR STUART sent to [facility] per pt's request.

## 2023-07-04 LAB — BACTERIA UR CULT: NORMAL

## 2023-07-06 DIAGNOSIS — E66.09 CLASS 1 OBESITY DUE TO EXCESS CALORIES WITH SERIOUS COMORBIDITY AND BODY MASS INDEX (BMI) OF 31.0 TO 31.9 IN ADULT: ICD-10-CM

## 2023-07-20 DIAGNOSIS — J01.00 ACUTE NON-RECURRENT MAXILLARY SINUSITIS: ICD-10-CM

## 2023-07-20 RX ORDER — FEXOFENADINE HCL 180 MG/1
180 TABLET ORAL DAILY
Qty: 90 TABLET | Refills: 2 | Status: SHIPPED | OUTPATIENT
Start: 2023-07-20

## 2023-08-02 ENCOUNTER — TELEPHONE (OUTPATIENT)
Dept: FAMILY MEDICINE CLINIC | Facility: CLINIC | Age: 53
End: 2023-08-02

## 2023-08-02 NOTE — TELEPHONE ENCOUNTER
Pt l/m stomach med is not covered and she feels like she has a ball in her stomach. L/m for pt to call office.

## 2023-08-02 NOTE — TELEPHONE ENCOUNTER
Per Pt , Pantoprazole is no longer covered by insurance. She was not given formulary alternative. She has not tried any orther PPI's . Please advise.

## 2023-08-03 ENCOUNTER — TELEPHONE (OUTPATIENT)
Dept: FAMILY MEDICINE CLINIC | Facility: CLINIC | Age: 53
End: 2023-08-03

## 2023-08-03 NOTE — LETTER
[facility]    [id number] [facility]  [address] [address] [phone]    Date: 08/03/23    Sujata Munoz  [address]    Dear Kirstie Casillas:    Your health is very important to us. We have identified you as currently needing a breast cancer screening. Mammograms are recommended for any woman 40 and over, and are low-dose radiation x-rays that can detect cancer too small to feel by you or your doctor. If you have an order you may call [address] at [phone] to schedule your mammogram. and For an order placed after April 2022, you also have the option to schedule yourself directly through [facility] for your Mammogram.    If you have not been seen in the last 6 months, please call [phone] to schedule an appointment at the office. In case you are receiving this letter and you have already had your mammogram, please contact our office at [phone] with the information so that we can obtain a copy for our records. If you have any issues with scheduling or transportation, please contact our office for assistance.      Sincerely,    [facility]

## 2023-08-05 DIAGNOSIS — K21.9 GASTROESOPHAGEAL REFLUX DISEASE WITHOUT ESOPHAGITIS: Primary | ICD-10-CM

## 2023-08-05 RX ORDER — OMEPRAZOLE 40 MG/1
40 CAPSULE, DELAYED RELEASE ORAL DAILY
Qty: 30 CAPSULE | Refills: 3 | Status: SHIPPED | OUTPATIENT
Start: 2023-08-05 | End: 2023-09-07

## 2023-08-30 DIAGNOSIS — J01.00 ACUTE NON-RECURRENT MAXILLARY SINUSITIS: ICD-10-CM

## 2023-08-30 RX ORDER — AZELASTINE HYDROCHLORIDE 137 UG/1
SPRAY, METERED NASAL
Qty: 30 ML | Refills: 1 | Status: SHIPPED | OUTPATIENT
Start: 2023-08-30

## 2023-09-07 DIAGNOSIS — K21.9 GASTROESOPHAGEAL REFLUX DISEASE WITHOUT ESOPHAGITIS: ICD-10-CM

## 2023-09-07 RX ORDER — OMEPRAZOLE 40 MG/1
40 CAPSULE, DELAYED RELEASE ORAL DAILY
Qty: 90 CAPSULE | Refills: 2 | Status: SHIPPED | OUTPATIENT
Start: 2023-09-07

## 2023-09-08 ENCOUNTER — TELEPHONE (OUTPATIENT)
Dept: FAMILY MEDICINE CLINIC | Facility: CLINIC | Age: 53
End: 2023-09-08

## 2023-09-18 DIAGNOSIS — E66.09 CLASS 1 OBESITY DUE TO EXCESS CALORIES WITH SERIOUS COMORBIDITY AND BODY MASS INDEX (BMI) OF 31.0 TO 31.9 IN ADULT: ICD-10-CM

## 2023-09-18 RX ORDER — SEMAGLUTIDE 2.4 MG/.75ML
2.4 INJECTION, SOLUTION SUBCUTANEOUS WEEKLY
Qty: 3 ML | Refills: 1 | Status: SHIPPED | OUTPATIENT
Start: 2023-09-18

## 2023-09-28 ENCOUNTER — RA CDI HCC (OUTPATIENT)
Dept: OTHER | Facility: HOSPITAL | Age: 53
End: 2023-09-28

## 2023-09-28 NOTE — PROGRESS NOTES
[address] [facility] coding opportunities       Chart reviewed, no opportunity found: CHART REVIEWED, NO OPPORTUNITY FOUND     Patients Insurance     Commercial Insurance: Ronni Ni

## 2023-10-02 ENCOUNTER — OFFICE VISIT (OUTPATIENT)
Dept: FAMILY MEDICINE CLINIC | Facility: CLINIC | Age: 53
End: 2023-10-02
Payer: COMMERCIAL

## 2023-10-02 VITALS
WEIGHT: 132.8 LBS | OXYGEN SATURATION: 99 % | TEMPERATURE: 97.6 F | HEART RATE: 98 BPM | RESPIRATION RATE: 16 BRPM | HEIGHT: 60 IN | DIASTOLIC BLOOD PRESSURE: 80 MMHG | SYSTOLIC BLOOD PRESSURE: 108 MMHG | BODY MASS INDEX: 26.07 KG/M2

## 2023-10-02 DIAGNOSIS — K21.9 GASTROESOPHAGEAL REFLUX DISEASE WITHOUT ESOPHAGITIS: ICD-10-CM

## 2023-10-02 DIAGNOSIS — R11.0 NAUSEA: ICD-10-CM

## 2023-10-02 DIAGNOSIS — J01.00 ACUTE NON-RECURRENT MAXILLARY SINUSITIS: Primary | ICD-10-CM

## 2023-10-02 DIAGNOSIS — E78.49 OTHER HYPERLIPIDEMIA: ICD-10-CM

## 2023-10-02 DIAGNOSIS — F33.9 DEPRESSION, RECURRENT (HCC): ICD-10-CM

## 2023-10-02 DIAGNOSIS — E66.09 CLASS 1 OBESITY DUE TO EXCESS CALORIES WITH SERIOUS COMORBIDITY AND BODY MASS INDEX (BMI) OF 31.0 TO 31.9 IN ADULT: ICD-10-CM

## 2023-10-02 PROBLEM — E66.811 CLASS 1 OBESITY DUE TO EXCESS CALORIES WITH SERIOUS COMORBIDITY AND BODY MASS INDEX (BMI) OF 31.0 TO 31.9 IN ADULT: Status: ACTIVE | Noted: 2023-10-02

## 2023-10-02 PROCEDURE — 99214 OFFICE O/P EST MOD 30 MIN: CPT | Performed by: FAMILY MEDICINE

## 2023-10-02 PROCEDURE — 87636 SARSCOV2 & INF A&B AMP PRB: CPT | Performed by: FAMILY MEDICINE

## 2023-10-02 RX ORDER — ONDANSETRON 4 MG/1
4 TABLET, ORALLY DISINTEGRATING ORAL EVERY 6 HOURS PRN
Qty: 24 TABLET | Refills: 1 | Status: SHIPPED | OUTPATIENT
Start: 2023-10-02

## 2023-10-02 RX ORDER — SEMAGLUTIDE 2.4 MG/.75ML
2.4 INJECTION, SOLUTION SUBCUTANEOUS WEEKLY
Qty: 9 ML | Refills: 1 | Status: SHIPPED | OUTPATIENT
Start: 2023-10-02

## 2023-10-02 RX ORDER — AZITHROMYCIN 250 MG/1
TABLET, FILM COATED ORAL
Qty: 6 TABLET | Refills: 0 | Status: SHIPPED | OUTPATIENT
Start: 2023-10-02 | End: 2023-10-06

## 2023-10-02 RX ORDER — BUPROPION HYDROCHLORIDE 150 MG/1
150 TABLET ORAL EVERY MORNING
Qty: 90 TABLET | Refills: 1 | Status: SHIPPED | OUTPATIENT
Start: 2023-10-02

## 2023-10-02 RX ORDER — OMEPRAZOLE 40 MG/1
40 CAPSULE, DELAYED RELEASE ORAL DAILY
Qty: 90 CAPSULE | Refills: 2 | Status: SHIPPED | OUTPATIENT
Start: 2023-10-02

## 2023-10-02 NOTE — ASSESSMENT & PLAN NOTE
Per patient, Zofran controlling nausea associated with CZVJLO. She will continue to take as needed. Patient given a refill. We will continue to monitor. Follow up visit in 3 months.

## 2023-10-02 NOTE — ASSESSMENT & PLAN NOTE
Per patient, stable on Wellbutrin. Continue with the same. She was given refill. We will continue with the same. Follow up visit in 3 months.

## 2023-10-02 NOTE — ASSESSMENT & PLAN NOTE
Per patient, controlled on pantoprazole. Continue with the same. Refills sent. We will continue to monitor. Follow up visit in 3 months.

## 2023-10-02 NOTE — ASSESSMENT & PLAN NOTE
Patient with history of HLD. Controlled without medication per last lipid panel. Patient to continue with healthy diet and regular exercise. We will continue to monitor fasting lipid panel. Follow up visit in 3 months. Patient to complete repeat labs prior to follow up.

## 2023-10-02 NOTE — PROGRESS NOTES
Name: Katia Braxton      : 1970      MRN: [id number]  Encounter Provider: Nathalie Gil MD  Encounter Date: 10/2/2023   Encounter department: [facility]     1. Acute non-recurrent maxillary sinusitis  Assessment & Plan:  Patient with viral symptoms for 4 days. We have swabbed her for COVID/flu in office today 10/2. We will contact her with the results. Patient advised to continue with Astelin spray and ibuprofen as needed. Patient gven prescription for azithromycin that she is advised to hold on to. If her symptoms do not resolve in the next few days, then she can start the azithromycin. If patient's symptoms do not resolve after the antibiotic course, then the patient can contact the office. Orders:  -     azithromycin (ZITHROMAX) 250 mg tablet; Take 2 tablets today then 1 tablet daily x 4 days  -     Covid/Flu- Office Collect    2. Class 1 obesity due to excess calories with serious comorbidity and body mass index (BMI) of 31.0 to 31.9 in adult  Assessment & Plan:  Patient with BMI of 25.94 in office today 10/2. Patient is to continue with Wegovy 2.4 mg weekly. She is advised to utilize Zofran for nausea as needed. Patient is also advised that if she continues to have significant weight loss, she can trial taking Wegovy every 8-10 days. Patient to continue with low carb diet and regular exercise. We will continue to monitor. Patient to return for follow up in 3 months. Orders:  -     Semaglutide-Weight Management [facility]) 2.4 MG/0.75ML; Inject 0.75 mL (2.4 mg total) under the skin once a week    3. Depression, recurrent ([address])  Assessment & Plan:  Per patient, stable on Wellbutrin. Continue with the same. She was given refill. We will continue with the same. Follow up visit in 3 months. Orders:  -     buPROPion (WELLBUTRIN XL) 150 mg 24 hr tablet; Take 1 tablet (150 mg total) by mouth every morning    4.  Gastroesophageal reflux disease without esophagitis  Assessment & Plan:  Per patient, controlled on pantoprazole. Continue with the same. Refills sent. We will continue to monitor. Follow up visit in 3 months. Orders:  -     omeprazole (PriLOSEC) 40 MG capsule; Take 1 capsule (40 mg total) by mouth daily    5. Nausea  Assessment & Plan:  Per patient, Zofran controlling nausea associated with OUGXAW. She will continue to take as needed. Patient given a refill. We will continue to monitor. Follow up visit in 3 months. Orders:  -     ondansetron (ZOFRAN-ODT) 4 mg disintegrating tablet; Take 1 tablet (4 mg total) by mouth every 6 (six) hours as needed for nausea or vomiting    6. Other hyperlipidemia  Assessment & Plan:  Patient with history of HLD. Controlled without medication per last lipid panel. Patient to continue with healthy diet and regular exercise. We will continue to monitor fasting lipid panel. Follow up visit in 3 months. Patient to complete repeat labs prior to follow up. Orders:  -     CBC and differential; Future  -     Comprehensive metabolic panel; Future  -     Lipid Panel with Direct LDL reflex; Future  -     TSH, 3rd generation with Free T4 reflex; Future           Subjective     MR is a 52year old female with a PMH of obesity, HLD, GERD, MDD, and anxiety who presents to the office for a 3 month follow up visit for weight management. Patient reports that she has been feeling viral symptoms since Thurday 9/28. She states that she has been having chills, dry cough, sore throat, headache, body aches, and fatigue. Patient denies fevers and facial tenderness/pressure. She states that Ibuprofen has been helping with her headache and that Astelin nasal spray has been helping with congestion. Patient has not had any Covid home testing. She reports sick contacts at work. For weight management, patient is continuing with Wegovy 2.4 mg weekly without any new concerns.  She is still having occasional nausea and constipation, but is able to control them. Patient states that she is following a good diet and is trying to get on an exercise routine. Patient did not complete her labs for the visit. Review of Systems   Constitutional: Positive for chills. Negative for fever and unexpected weight change. HENT: Positive for congestion, rhinorrhea and sore throat. Negative for ear pain and tinnitus. Eyes: Negative for visual disturbance. Respiratory: Positive for cough. Negative for shortness of breath and wheezing. Cardiovascular: Negative for chest pain, palpitations and leg swelling. Gastrointestinal: Positive for nausea. Negative for abdominal pain, diarrhea and vomiting. Endocrine: Negative for polydipsia, polyphagia and polyuria. Genitourinary: Negative for dysuria and hematuria. Musculoskeletal: Positive for myalgias. Negative for arthralgias. Neurological: Positive for headaches. Negative for dizziness and syncope. Psychiatric/Behavioral: Negative for dysphoric mood. The patient is not nervous/anxious.         Past Medical History:   Diagnosis Date   • Allergic    • Anxiety    • Asthma    • GI symptoms 05/27/2020   • Lactose intolerance      Past Surgical History:   Procedure Laterality Date   • BACK SURGERY  2012   • OVARIAN CYST REMOVAL Left 2008   • UPPER GASTROINTESTINAL ENDOSCOPY  04/03/2007    Gastritis-biopsy negative for H. pylori by [facility]     Family History   Problem Relation Age of Onset   • Hypertension Father    • Hypertension Sister    • Lung cancer Sister    • Cancer Maternal Aunt    • Breast cancer additional onset Cousin    • Cancer Cousin      Social History     Socioeconomic History   • Marital status: /Civil Union     Spouse name: None   • Number of children: None   • Years of education: None   • Highest education level: None   Occupational History   • 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   Tobacco Use   • Smoking status: Never   • Smokeless tobacco: Never   • Tobacco comments:     no passive smoke exposure Vaping Use   • Vaping Use: Never used   Substance and Sexual Activity   • Alcohol use: Never   • Drug use: Never   • Sexual activity: None   Other Topics Concern   • None   Social History Narrative   • None     Social Determinants of Health     Financial Resource Strain: Not on file   Food Insecurity: Not on file   Transportation Needs: Not on file   Physical Activity: Not on file   Stress: Not on file   Social Connections: Not on file   Intimate Partner Violence: Not on file   Housing Stability: Not on file     Current Outpatient Medications on File Prior to Visit   Medication Sig   • albuterol (PROVENTIL HFA,VENTOLIN HFA) 90 mcg/act inhaler albuterol sulfate HFA 90 mcg/actuation aerosol inhaler   • Azelastine HCl 137 MCG/SPRAY SOLN 1 SPRAY INTO EACH NOSTRIL 2 (TWO) TIMES A DAY USE IN EACH NOSTRIL AS DIRECTED   • cyclobenzaprine (FLEXERIL) 10 mg tablet TAKE 1 TABLET BY MOUTH DAILY AS NEEDED FOR MUSCLE SPASMS. • fexofenadine (ALLEGRA) 180 MG tablet TAKE 1 TABLET BY MOUTH EVERY DAY   • LORazepam (ATIVAN) 0.5 mg tablet Take 1 tablet (0.5 mg total) by mouth every 8 (eight) hours as needed for anxiety   • [DISCONTINUED] buPROPion (WELLBUTRIN XL) 150 mg 24 hr tablet Take 1 tablet (150 mg total) by mouth every morning   • [DISCONTINUED] omeprazole (PriLOSEC) 40 MG capsule TAKE 1 CAPSULE (40 MG TOTAL) BY MOUTH DAILY.    • [DISCONTINUED] ondansetron (ZOFRAN-ODT) 4 mg disintegrating tablet Take 1 tablet (4 mg total) by mouth every 6 (six) hours as needed for nausea or vomiting   • [DISCONTINUED] Wegovy 2.4 MG/0.75ML INJECT 0.75 ML (2.4 MG TOTAL) UNDER THE SKIN ONCE A WEEK   • hydrOXYzine HCL (ATARAX) 25 mg tablet Take 1 tablet (25 mg total) by mouth 2 (two) times a day as needed for itching or anxiety (Patient not taking: Reported on 5/1/2023)   • hyoscyamine (LEVSIN/SL) 0.125 mg SL tablet Take 1 tablet (0.125 mg total) by mouth 2 (two) times a day   • montelukast (SINGULAIR) 10 mg tablet Take 10 mg by mouth daily at bedtime (Patient not taking: Reported on 10/2/2023)   • [DISCONTINUED] fluticasone (FLONASE) 50 mcg/act nasal spray SPRAY 1 SPRAY INTO EACH NOSTRIL EVERY DAY (Patient not taking: Reported on 7/3/2023)   • [DISCONTINUED] phenazopyridine (PYRIDIUM) 200 mg tablet Take 1 tablet (200 mg total) by mouth 3 (three) times a day with meals (Patient not taking: Reported on 10/2/2023)     Allergies   Allergen Reactions   • Acetaminophen Other (See Comments)   • Codeine GI Intolerance and Other (See Comments)   • Hydrocodone-Acetaminophen Other (See Comments) and Headache     Immunization History   Administered Date(s) Administered   • COVID-19 PFIZER VACCINE 0.3 ML IM 04/16/2021, 05/07/2021   • Td (adult), Unspecified 09/30/2012   • Tdap 09/30/2012       Objective     /80 (BP Location: Left arm, Patient Position: Sitting, Cuff Size: Standard)   Pulse 98   Temp 97.6 °F (36.4 °C) (Tympanic)   Resp 16   Ht 5' (1.524 m)   Wt 60.2 kg (132 lb 12.8 oz)   SpO2 99%   BMI 25.94 kg/m²     Physical Exam  Vitals and nursing note reviewed. Constitutional:       General: She is not in acute distress. Appearance: Normal appearance. She is normal weight. She is not toxic-appearing. HENT:      Head: Normocephalic and atraumatic. Right Ear: Tympanic membrane and external ear normal. There is no impacted cerumen. Left Ear: Tympanic membrane and external ear normal. There is no impacted cerumen. Nose: Congestion present. No rhinorrhea. Mouth/Throat:      Mouth: Mucous membranes are moist.      Pharynx: Posterior oropharyngeal erythema present. No oropharyngeal exudate. Eyes:      Conjunctiva/sclera: Conjunctivae normal.      Pupils: Pupils are equal, round, and reactive to light. Cardiovascular:      Rate and Rhythm: Normal rate and regular rhythm. Pulses: Normal pulses. Heart sounds: Normal heart sounds. No murmur heard. No friction rub. No gallop.    Pulmonary:      Effort: Pulmonary effort is normal. No respiratory distress. Breath sounds: Normal breath sounds. No wheezing, rhonchi or rales. Musculoskeletal:         General: Normal range of motion. Cervical back: Normal range of motion and neck supple. No tenderness. Right lower leg: No edema. Left lower leg: No edema. Lymphadenopathy:      Cervical: No cervical adenopathy. Skin:     General: Skin is warm and dry. Findings: No erythema or lesion. Neurological:      General: No focal deficit present. Mental Status: She is alert and oriented to person, place, and time.    Psychiatric:         Mood and Affect: Mood normal.         Behavior: Behavior normal.       Nathalie Gil MD

## 2023-10-02 NOTE — ASSESSMENT & PLAN NOTE
Patient with BMI of 25.94 in office today 10/2. Patient is to continue with Wegovy 2.4 mg weekly. She is advised to utilize Zofran for nausea as needed. Patient is also advised that if she continues to have significant weight loss, she can trial taking Wegovy every 8-10 days. Patient to continue with low carb diet and regular exercise. We will continue to monitor. Patient to return for follow up in 3 months.

## 2023-10-02 NOTE — ASSESSMENT & PLAN NOTE
Patient with viral symptoms for 4 days. We have swabbed her for COVID/flu in office today 10/2. We will contact her with the results. Patient advised to continue with Astelin spray and ibuprofen as needed. Patient gven prescription for azithromycin that she is advised to hold on to. If her symptoms do not resolve in the next few days, then she can start the azithromycin. If patient's symptoms do not resolve after the antibiotic course, then the patient can contact the office.

## 2023-10-03 LAB
FLUAV RNA RESP QL NAA+PROBE: NEGATIVE
FLUBV RNA RESP QL NAA+PROBE: NEGATIVE
SARS-COV-2 RNA RESP QL NAA+PROBE: NEGATIVE

## 2023-10-18 ENCOUNTER — OFFICE VISIT (OUTPATIENT)
Dept: FAMILY MEDICINE CLINIC | Facility: CLINIC | Age: 53
End: 2023-10-18
Payer: COMMERCIAL

## 2023-10-18 VITALS
DIASTOLIC BLOOD PRESSURE: 80 MMHG | BODY MASS INDEX: 25.68 KG/M2 | RESPIRATION RATE: 16 BRPM | HEIGHT: 60 IN | HEART RATE: 96 BPM | OXYGEN SATURATION: 97 % | WEIGHT: 130.8 LBS | SYSTOLIC BLOOD PRESSURE: 110 MMHG | TEMPERATURE: 97.6 F

## 2023-10-18 DIAGNOSIS — J30.89 NON-SEASONAL ALLERGIC RHINITIS DUE TO OTHER ALLERGIC TRIGGER: Primary | ICD-10-CM

## 2023-10-18 DIAGNOSIS — M95.4 DEFORMITY OF STERNUM: ICD-10-CM

## 2023-10-18 PROBLEM — J30.9 ALLERGIC RHINITIS DUE TO ALLERGEN: Status: ACTIVE | Noted: 2023-10-18

## 2023-10-18 PROCEDURE — 99213 OFFICE O/P EST LOW 20 MIN: CPT | Performed by: FAMILY MEDICINE

## 2023-10-18 RX ORDER — AZELASTINE 1 MG/ML
1 SPRAY, METERED NASAL 2 TIMES DAILY
Qty: 30 ML | Refills: 3 | Status: SHIPPED | OUTPATIENT
Start: 2023-10-18

## 2023-10-18 NOTE — PROGRESS NOTES
Name: Juan Fulton      : 1970      MRN: [id number]  Encounter Provider: Collette Mate, MD  Encounter Date: 10/18/2023   Encounter department: [facility]     1. Non-seasonal allergic rhinitis due to other allergic trigger  Assessment & Plan:  Stable on meds. I sent a prescription for Astelin nasal spray. Orders:  -     azelastine (ASTELIN) 0.1 % nasal spray; 1 spray into each nostril 2 (two) times a day Use in each nostril as directed    2. Deformity of sternum  Assessment & Plan: It was discussed with patient symptoms worsening, consider x-ray of her rib cage             Subjective     She is here today with complaint of chest tenderness over her sternum bone. She stated she has been noticing her sternum protruding since she lost her weight. She has history of asthma since she was a child. She denies any other complaint. Review of Systems   Constitutional:  Negative for chills and fever. HENT:  Negative for trouble swallowing. Eyes:  Negative for visual disturbance. Respiratory:  Negative for cough and shortness of breath. Cardiovascular:  Negative for chest pain, palpitations and leg swelling. Gastrointestinal:  Negative for abdominal pain, constipation and diarrhea. Endocrine: Negative for cold intolerance and heat intolerance. Genitourinary:  Negative for difficulty urinating and dysuria. Musculoskeletal:  Negative for gait problem. Skin:  Negative for rash. Neurological:  Negative for dizziness, tremors, seizures and headaches. Hematological:  Negative for adenopathy. Psychiatric/Behavioral:  Negative for behavioral problems.         Past Medical History:   Diagnosis Date   • Allergic    • Anxiety    • Asthma    • GI symptoms 2020   • Lactose intolerance      Past Surgical History:   Procedure Laterality Date   • BACK SURGERY     • OVARIAN CYST REMOVAL Left    • UPPER GASTROINTESTINAL ENDOSCOPY 04/03/2007    Gastritis-biopsy negative for H. pylori by [facility]     Family History   Problem Relation Age of Onset   • Hypertension Father    • Hypertension Sister    • Lung cancer Sister    • Cancer Maternal Aunt    • Breast cancer additional onset Cousin    • Cancer Cousin      Social History     Socioeconomic History   • Marital status: /Civil Union     Spouse name: None   • Number of children: None   • Years of education: None   • Highest education level: None   Occupational History   • 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   Tobacco Use   • Smoking status: Never   • Smokeless tobacco: Never   • Tobacco comments:     no passive smoke exposure   Vaping Use   • Vaping Use: Never used   Substance and Sexual Activity   • Alcohol use: Never   • Drug use: Never   • Sexual activity: None   Other Topics Concern   • None   Social History Narrative   • None     Social Determinants of Health     Financial Resource Strain: Not on file   Food Insecurity: Not on file   Transportation Needs: Not on file   Physical Activity: Not on file   Stress: Not on file   Social Connections: Not on file   Intimate Partner Violence: Not on file   Housing Stability: Not on file     Current Outpatient Medications on File Prior to Visit   Medication Sig   • albuterol (PROVENTIL HFA,VENTOLIN HFA) 90 mcg/act inhaler albuterol sulfate HFA 90 mcg/actuation aerosol inhaler   • buPROPion (WELLBUTRIN XL) 150 mg 24 hr tablet Take 1 tablet (150 mg total) by mouth every morning   • cyclobenzaprine (FLEXERIL) 10 mg tablet TAKE 1 TABLET BY MOUTH DAILY AS NEEDED FOR MUSCLE SPASMS.    • fexofenadine (ALLEGRA) 180 MG tablet TAKE 1 TABLET BY MOUTH EVERY DAY   • LORazepam (ATIVAN) 0.5 mg tablet Take 1 tablet (0.5 mg total) by mouth every 8 (eight) hours as needed for anxiety   • omeprazole (PriLOSEC) 40 MG capsule Take 1 capsule (40 mg total) by mouth daily   • ondansetron (ZOFRAN-ODT) 4 mg disintegrating tablet Take 1 tablet (4 mg total) by mouth every 6 (six) hours as needed for nausea or vomiting   • Semaglutide-Weight Management (Wegovy) 2.4 MG/0.75ML Inject 0.75 mL (2.4 mg total) under the skin once a week   • [DISCONTINUED] Azelastine HCl 137 MCG/SPRAY SOLN 1 SPRAY INTO EACH NOSTRIL 2 (TWO) TIMES A DAY USE IN EACH NOSTRIL AS DIRECTED   • hydrOXYzine HCL (ATARAX) 25 mg tablet Take 1 tablet (25 mg total) by mouth 2 (two) times a day as needed for itching or anxiety (Patient not taking: Reported on 5/1/2023)   • hyoscyamine (LEVSIN/SL) 0.125 mg SL tablet Take 1 tablet (0.125 mg total) by mouth 2 (two) times a day   • montelukast (SINGULAIR) 10 mg tablet Take 10 mg by mouth daily at bedtime (Patient not taking: Reported on 10/2/2023)     Allergies   Allergen Reactions   • Acetaminophen Other (See Comments)   • Codeine GI Intolerance and Other (See Comments)   • Hydrocodone-Acetaminophen Other (See Comments) and Headache     Immunization History   Administered Date(s) Administered   • COVID-19 PFIZER VACCINE 0.3 ML IM 04/16/2021, 05/07/2021   • Td (adult), Unspecified 09/30/2012   • Tdap 09/30/2012       Objective     /80 (BP Location: Left arm, Patient Position: Sitting, Cuff Size: Standard)   Pulse 96   Temp 97.6 °F (36.4 °C) (Tympanic)   Resp 16   Ht 5' (1.524 m)   Wt 59.3 kg (130 lb 12.8 oz)   SpO2 97%   BMI 25.55 kg/m²     Physical Exam  Vitals and nursing note reviewed. Constitutional:       Appearance: She is well-developed. HENT:      Head: Normocephalic and atraumatic. Eyes:      Pupils: Pupils are equal, round, and reactive to light. Cardiovascular:      Rate and Rhythm: Normal rate and regular rhythm. Heart sounds: Normal heart sounds. Pulmonary:      Effort: Pulmonary effort is normal.      Breath sounds: Normal breath sounds. Chest:      Chest wall: Deformity present. Comments: Palpable protruded sternum  Abdominal:      General: Bowel sounds are normal.      Palpations: Abdomen is soft.    Musculoskeletal:      Cervical back: Normal range of motion and neck supple. Lymphadenopathy:      Cervical: No cervical adenopathy. Skin:     General: Skin is warm. Neurological:      Mental Status: She is alert and oriented to person, place, and time.        Katarzyna Flores MD

## 2023-11-06 ENCOUNTER — TELEPHONE (OUTPATIENT)
Dept: FAMILY MEDICINE CLINIC | Facility: CLINIC | Age: 53
End: 2023-11-06
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
## 2023-11-06 NOTE — TELEPHONE ENCOUNTER
date of birth [birth year] phone number [phone]. I was just touching base. I called this morning because of my back. I pulled it and the Flexeril and Ibuprofen are not helping the spasms, so she was going to talk to Doctor A and get back to me. I haven't heard back. It's about 4:00 and if he does want to see me, I'm able to. If you guys have any openings, I'm able to be there early in the morning. I'll wait to hear back. Thank you.

## 2023-11-07 ENCOUNTER — OFFICE VISIT (OUTPATIENT)
Dept: FAMILY MEDICINE CLINIC | Facility: CLINIC | Age: 53
End: 2023-11-07
Payer: COMMERCIAL

## 2023-11-07 VITALS
OXYGEN SATURATION: 100 % | HEIGHT: 60 IN | WEIGHT: 126.8 LBS | HEART RATE: 104 BPM | BODY MASS INDEX: 24.9 KG/M2 | DIASTOLIC BLOOD PRESSURE: 70 MMHG | SYSTOLIC BLOOD PRESSURE: 108 MMHG | RESPIRATION RATE: 16 BRPM | TEMPERATURE: 97.4 F

## 2023-11-07 DIAGNOSIS — M54.50 ACUTE LOW BACK PAIN, UNSPECIFIED BACK PAIN LATERALITY, UNSPECIFIED WHETHER SCIATICA PRESENT: Primary | ICD-10-CM

## 2023-11-07 DIAGNOSIS — M54.9 ACUTE BACK PAIN, UNSPECIFIED BACK LOCATION, UNSPECIFIED BACK PAIN LATERALITY: ICD-10-CM

## 2023-11-07 PROCEDURE — 99213 OFFICE O/P EST LOW 20 MIN: CPT | Performed by: FAMILY MEDICINE

## 2023-11-07 RX ORDER — CYCLOBENZAPRINE HCL 10 MG
10 TABLET ORAL 3 TIMES DAILY PRN
Qty: 30 TABLET | Refills: 0 | Status: SHIPPED | OUTPATIENT
Start: 2023-11-07

## 2023-11-07 RX ORDER — KETOROLAC TROMETHAMINE 10 MG/1
10 TABLET, FILM COATED ORAL EVERY 6 HOURS PRN
Qty: 20 TABLET | Refills: 0 | Status: SHIPPED | OUTPATIENT
Start: 2023-11-07

## 2023-11-07 RX ORDER — PREDNISONE 50 MG/1
50 TABLET ORAL DAILY
Qty: 5 TABLET | Refills: 0 | Status: SHIPPED | OUTPATIENT
Start: 2023-11-07 | End: 2023-11-12
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
## 2023-11-07 NOTE — ASSESSMENT & PLAN NOTE
She was given prescription for prednisone 50 mg 1 tablet daily for 5 days. Discussed possible side effects. She was given refill for Flexeril. She was told to take Tylenol for pain. If symptoms not improving call or come back.

## 2023-11-09 DIAGNOSIS — E66.09 CLASS 1 OBESITY DUE TO EXCESS CALORIES WITH SERIOUS COMORBIDITY AND BODY MASS INDEX (BMI) OF 31.0 TO 31.9 IN ADULT: ICD-10-CM

## 2023-11-09 RX ORDER — SEMAGLUTIDE 2.4 MG/.75ML
2.4 INJECTION, SOLUTION SUBCUTANEOUS WEEKLY
Qty: 3 ML | Refills: 1 | Status: SHIPPED | OUTPATIENT
Start: 2023-11-09

## 2023-11-10 ENCOUNTER — TELEPHONE (OUTPATIENT)
Dept: FAMILY MEDICINE CLINIC | Facility: CLINIC | Age: 53
End: 2023-11-10

## 2023-11-10 DIAGNOSIS — M54.50 ACUTE LOW BACK PAIN, UNSPECIFIED BACK PAIN LATERALITY, UNSPECIFIED WHETHER SCIATICA PRESENT: Primary | ICD-10-CM

## 2023-11-10 RX ORDER — GABAPENTIN 100 MG/1
100 CAPSULE ORAL 3 TIMES DAILY
Qty: 90 CAPSULE | Refills: 0 | Status: SHIPPED | OUTPATIENT
Start: 2023-11-10

## 2023-11-10 NOTE — TELEPHONE ENCOUNTER
Patient called and said that the toradal is not helping at all. She does have an appointment with pain management next Thursday but is asking for something to do or take in the meantime. Patient did call back and said in the past she was given gabapentin and is asking if she should take this again.

## 2023-12-01 PROBLEM — J01.00 ACUTE NON-RECURRENT MAXILLARY SINUSITIS: Status: RESOLVED | Noted: 2023-10-02 | Resolved: 2023-12-01

## 2023-12-28 DIAGNOSIS — E66.09 CLASS 1 OBESITY DUE TO EXCESS CALORIES WITH SERIOUS COMORBIDITY AND BODY MASS INDEX (BMI) OF 31.0 TO 31.9 IN ADULT: ICD-10-CM

## 2023-12-29 RX ORDER — SEMAGLUTIDE 2.4 MG/.75ML
2.4 INJECTION, SOLUTION SUBCUTANEOUS WEEKLY
Qty: 3 ML | Refills: 1 | Status: SHIPPED | OUTPATIENT
Start: 2023-12-29

## 2024-01-04 ENCOUNTER — RA CDI HCC (OUTPATIENT)
Dept: OTHER | Facility: HOSPITAL | Age: 54
End: 2024-01-04

## 2024-01-04 NOTE — PROGRESS NOTES
HCC coding opportunities          Chart Reviewed number of suggestions sent to Provider: 1     Patients Insurance        Commercial Insurance: [employer]       J45.20: Mild intermittent asthma without complication     Last assessed on 7/3/2023 - please review and assess and document per MEAT if applicable for 2024

## 2024-01-06 ENCOUNTER — APPOINTMENT (OUTPATIENT)
Dept: LAB | Age: 54
End: 2024-01-06
Payer: COMMERCIAL

## 2024-01-06 DIAGNOSIS — E78.49 OTHER HYPERLIPIDEMIA: ICD-10-CM

## 2024-01-06 LAB
ALBUMIN SERPL BCP-MCNC: 4.2 G/DL (ref 3.5–5)
ALP SERPL-CCNC: 45 U/L (ref 34–104)
ALT SERPL W P-5'-P-CCNC: 11 U/L (ref 7–52)
ANION GAP SERPL CALCULATED.3IONS-SCNC: 9 MMOL/L
AST SERPL W P-5'-P-CCNC: 17 U/L (ref 13–39)
BASOPHILS # BLD AUTO: 0.07 THOUSANDS/ÂΜL (ref 0–0.1)
BASOPHILS NFR BLD AUTO: 1 % (ref 0–1)
BILIRUB SERPL-MCNC: 0.8 MG/DL (ref 0.2–1)
BUN SERPL-MCNC: 14 MG/DL (ref 5–25)
CALCIUM SERPL-MCNC: 10.2 MG/DL (ref 8.4–10.2)
CHLORIDE SERPL-SCNC: 104 MMOL/L (ref 96–108)
CHOLEST SERPL-MCNC: 159 MG/DL
CO2 SERPL-SCNC: 28 MMOL/L (ref 21–32)
CREAT SERPL-MCNC: 0.82 MG/DL (ref 0.6–1.3)
EOSINOPHIL # BLD AUTO: 0.05 THOUSAND/ÂΜL (ref 0–0.61)
EOSINOPHIL NFR BLD AUTO: 1 % (ref 0–6)
ERYTHROCYTE [DISTWIDTH] IN BLOOD BY AUTOMATED COUNT: 13.7 % (ref 11.6–15.1)
GFR SERPL CREATININE-BSD FRML MDRD: 80 ML/MIN/1.73SQ M
GLUCOSE P FAST SERPL-MCNC: 61 MG/DL (ref 65–99)
HCT VFR BLD AUTO: 43.5 % (ref 34.8–46.1)
HDLC SERPL-MCNC: 65 MG/DL
HGB BLD-MCNC: 13.9 G/DL (ref 11.5–15.4)
IMM GRANULOCYTES # BLD AUTO: 0.03 THOUSAND/UL (ref 0–0.2)
IMM GRANULOCYTES NFR BLD AUTO: 0 % (ref 0–2)
LDLC SERPL CALC-MCNC: 83 MG/DL (ref 0–100)
LYMPHOCYTES # BLD AUTO: 1.46 THOUSANDS/ÂΜL (ref 0.6–4.47)
LYMPHOCYTES NFR BLD AUTO: 19 % (ref 14–44)
MCH RBC QN AUTO: 30.5 PG (ref 26.8–34.3)
MCHC RBC AUTO-ENTMCNC: 32 G/DL (ref 31.4–37.4)
MCV RBC AUTO: 95 FL (ref 82–98)
MONOCYTES # BLD AUTO: 0.51 THOUSAND/ÂΜL (ref 0.17–1.22)
MONOCYTES NFR BLD AUTO: 7 % (ref 4–12)
NEUTROPHILS # BLD AUTO: 5.48 THOUSANDS/ÂΜL (ref 1.85–7.62)
NEUTS SEG NFR BLD AUTO: 72 % (ref 43–75)
NRBC BLD AUTO-RTO: 0 /100 WBCS
PLATELET # BLD AUTO: 360 THOUSANDS/UL (ref 149–390)
PMV BLD AUTO: 10.6 FL (ref 8.9–12.7)
POTASSIUM SERPL-SCNC: 3.8 MMOL/L (ref 3.5–5.3)
PROT SERPL-MCNC: 6.4 G/DL (ref 6.4–8.4)
RBC # BLD AUTO: 4.56 MILLION/UL (ref 3.81–5.12)
SODIUM SERPL-SCNC: 141 MMOL/L (ref 135–147)
TRIGL SERPL-MCNC: 57 MG/DL
TSH SERPL DL<=0.05 MIU/L-ACNC: 0.82 UIU/ML (ref 0.45–4.5)
WBC # BLD AUTO: 7.6 THOUSAND/UL (ref 4.31–10.16)

## 2024-01-06 PROCEDURE — 84443 ASSAY THYROID STIM HORMONE: CPT

## 2024-01-06 PROCEDURE — 36415 COLL VENOUS BLD VENIPUNCTURE: CPT

## 2024-01-06 PROCEDURE — 80053 COMPREHEN METABOLIC PANEL: CPT

## 2024-01-06 PROCEDURE — 85025 COMPLETE CBC W/AUTO DIFF WBC: CPT

## 2024-01-06 PROCEDURE — 80061 LIPID PANEL: CPT

## 2024-01-09 ENCOUNTER — OFFICE VISIT (OUTPATIENT)
Dept: FAMILY MEDICINE CLINIC | Facility: CLINIC | Age: 54
End: 2024-01-09
Payer: COMMERCIAL

## 2024-01-09 VITALS
HEIGHT: 60 IN | HEART RATE: 86 BPM | WEIGHT: 123 LBS | RESPIRATION RATE: 16 BRPM | SYSTOLIC BLOOD PRESSURE: 128 MMHG | DIASTOLIC BLOOD PRESSURE: 66 MMHG | OXYGEN SATURATION: 98 % | TEMPERATURE: 97.4 F | BODY MASS INDEX: 24.15 KG/M2

## 2024-01-09 DIAGNOSIS — J30.89 NON-SEASONAL ALLERGIC RHINITIS DUE TO OTHER ALLERGIC TRIGGER: ICD-10-CM

## 2024-01-09 DIAGNOSIS — J01.00 ACUTE NON-RECURRENT MAXILLARY SINUSITIS: ICD-10-CM

## 2024-01-09 DIAGNOSIS — F33.2 MDD (MAJOR DEPRESSIVE DISORDER), RECURRENT SEVERE, WITHOUT PSYCHOSIS (HCC): ICD-10-CM

## 2024-01-09 DIAGNOSIS — F41.9 ANXIETY: Primary | ICD-10-CM

## 2024-01-09 DIAGNOSIS — R11.0 NAUSEA: ICD-10-CM

## 2024-01-09 DIAGNOSIS — E66.09 CLASS 1 OBESITY DUE TO EXCESS CALORIES WITH SERIOUS COMORBIDITY AND BODY MASS INDEX (BMI) OF 31.0 TO 31.9 IN ADULT: ICD-10-CM

## 2024-01-09 PROCEDURE — 99213 OFFICE O/P EST LOW 20 MIN: CPT | Performed by: FAMILY MEDICINE

## 2024-01-09 RX ORDER — ONDANSETRON 4 MG/1
4 TABLET, ORALLY DISINTEGRATING ORAL EVERY 6 HOURS PRN
Qty: 24 TABLET | Refills: 1 | Status: SHIPPED | OUTPATIENT
Start: 2024-01-09

## 2024-01-09 RX ORDER — FEXOFENADINE HCL 180 MG/1
180 TABLET ORAL DAILY
Qty: 90 TABLET | Refills: 2 | Status: SHIPPED | OUTPATIENT
Start: 2024-01-09

## 2024-01-09 RX ORDER — SEMAGLUTIDE 2.4 MG/.75ML
2.4 INJECTION, SOLUTION SUBCUTANEOUS WEEKLY
Qty: 9 ML | Refills: 1 | Status: SHIPPED | OUTPATIENT
Start: 2024-01-09

## 2024-01-09 RX ORDER — AZELASTINE 1 MG/ML
1 SPRAY, METERED NASAL 2 TIMES DAILY
Qty: 90 ML | Refills: 3 | Status: SHIPPED | OUTPATIENT
Start: 2024-01-09

## 2024-01-09 NOTE — PROGRESS NOTES
Name: Kavitha Marx      : 1970      MRN: [id number]  Encounter Provider: Dharmesh Humphries MD  Encounter Date: 2024   Encounter department: [facility]    Assessment & Plan     1. Anxiety    2. Nausea  Assessment & Plan:  She was given prescription for Zofran.    Orders:  -     ondansetron (ZOFRAN-ODT) 4 mg disintegrating tablet; Take 1 tablet (4 mg total) by mouth every 6 (six) hours as needed for nausea or vomiting    3. Non-seasonal allergic rhinitis due to other allergic trigger  Assessment & Plan:  Stable on her medications.  Continue same.  Will continue to monitor.    Orders:  -     azelastine (ASTELIN) 0.1 % nasal spray; 1 spray into each nostril 2 (two) times a day Use in each nostril as directed    4. MDD (major depressive disorder), recurrent severe, without psychosis (HCC)  Assessment & Plan:  She cut down on Wellbutrin she is doing well.  Stable without medications.  We will continue to monitor.      5. Acute non-recurrent maxillary sinusitis  -     fexofenadine (ALLEGRA) 180 MG tablet; Take 1 tablet (180 mg total) by mouth daily    6. BMI 24.0-24.9, adult  Assessment & Plan:  It was discussed with patient about using Wegovy every 2 to 3 weeks and to add protein to her diet.  Continue to monitor her weight.  Come back in 3 months.      7. Class 1 obesity due to excess calories with serious comorbidity and body mass index (BMI) of 31.0 to 31.9 in adult  Assessment & Plan:  It was discussed with patient about using Wegovy every 2 to 3 weeks and to add protein to her diet.  Continue to monitor her weight.  Come back in 3 months.    Orders:  -     Semaglutide-Weight Management (Wegovy) 2.4 MG/0.75ML; Inject 0.75 mL (2.4 mg total) under the skin once a week         Subjective     She is here today for follow-up multiple medical problems.  She continues to use her Wegovy.  She lost 3 pounds since her last visit.  She has been taking her Wegovy every 10 days.  Her blood  work came back normal.  She stopped taking Wellbutrin and she is doing well without medication.  Denies feeling depressed.      Review of Systems   Constitutional:  Negative for chills and fever.   HENT:  Negative for trouble swallowing.    Eyes:  Negative for visual disturbance.   Respiratory:  Negative for cough and shortness of breath.    Cardiovascular:  Negative for chest pain, palpitations and leg swelling.   Gastrointestinal:  Negative for abdominal pain, constipation and diarrhea.   Endocrine: Negative for cold intolerance and heat intolerance.   Genitourinary:  Negative for difficulty urinating and dysuria.   Musculoskeletal:  Negative for gait problem.   Skin:  Negative for rash.   Neurological:  Negative for dizziness, tremors, seizures and headaches.   Hematological:  Negative for adenopathy.   Psychiatric/Behavioral:  Negative for behavioral problems.        Past Medical History:   Diagnosis Date   • Allergic    • Anxiety    • Asthma    • GI symptoms 05/27/2020   • Lactose intolerance      Past Surgical History:   Procedure Laterality Date   • BACK SURGERY  2012   • OVARIAN CYST REMOVAL Left 2008   • UPPER GASTROINTESTINAL ENDOSCOPY  04/03/2007    Gastritis-biopsy negative for H. pylori by [facility]     Family History   Problem Relation Age of Onset   • Hypertension Father    • Hypertension Sister    • Lung cancer Sister    • Cancer Maternal Aunt    • Breast cancer additional onset Cousin    • Cancer Cousin      Social History     Socioeconomic History   • Marital status: /Civil Union     Spouse name: None   • Number of children: None   • Years of education: None   • Highest education level: None   Occupational History   • 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able   Tobacco Use   • Smoking status: Never   • Smokeless tobacco: Never   • Tobacco comments:     no passive smoke exposure   Vaping Use   • Vaping status: Never Used   Substance and Sexual Activity   • Alcohol use: Never   • Drug use: Never   • Sexual  activity: None   Other Topics Concern   • None   Social History Narrative   • None     Social Determinants of Health     Financial Resource Strain: Not on file   Food Insecurity: Not on file   Transportation Needs: Not on file   Physical Activity: Not on file   Stress: Not on file   Social Connections: Not on file   Intimate Partner Violence: Not on file   Housing Stability: Not on file     Current Outpatient Medications on File Prior to Visit   Medication Sig   • albuterol (PROVENTIL HFA,VENTOLIN HFA) 90 mcg/act inhaler albuterol sulfate HFA 90 mcg/actuation aerosol inhaler   • cyclobenzaprine (FLEXERIL) 10 mg tablet Take 1 tablet (10 mg total) by mouth 3 (three) times a day as needed for muscle spasms   • gabapentin (Neurontin) 100 mg capsule Take 1 capsule (100 mg total) by mouth 3 (three) times a day   • hyoscyamine (LEVSIN/SL) 0.125 mg SL tablet Take 1 tablet (0.125 mg total) by mouth 2 (two) times a day   • omeprazole (PriLOSEC) 40 MG capsule Take 1 capsule (40 mg total) by mouth daily   • ketorolac (TORADOL) 10 mg tablet Take 1 tablet (10 mg total) by mouth every 6 (six) hours as needed for moderate pain (Patient not taking: Reported on 1/9/2024)   • LORazepam (ATIVAN) 0.5 mg tablet Take 1 tablet (0.5 mg total) by mouth every 8 (eight) hours as needed for anxiety (Patient not taking: Reported on 1/9/2024)     Allergies   Allergen Reactions   • Acetaminophen Other (See Comments)   • Codeine GI Intolerance and Other (See Comments)   • Hydrocodone-Acetaminophen Other (See Comments) and Headache     Immunization History   Administered Date(s) Administered   • COVID-19 PFIZER VACCINE 0.3 ML IM 04/16/2021, 05/07/2021   • Td (adult), Unspecified 09/30/2012   • Tdap 09/30/2012       Objective     /66 (BP Location: Left arm, Patient Position: Sitting, Cuff Size: Adult)   Pulse 86   Temp (!) 97.4 °F (36.3 °C) (Tympanic)   Resp 16   Ht 5' (1.524 m)   Wt 55.8 kg (123 lb)   SpO2 98%   BMI 24.02 kg/m²      Physical Exam  Vitals and nursing note reviewed.   Constitutional:       Appearance: She is well-developed.   HENT:      Head: Normocephalic and atraumatic.   Eyes:      Pupils: Pupils are equal, round, and reactive to light.   Cardiovascular:      Rate and Rhythm: Normal rate and regular rhythm.      Heart sounds: Normal heart sounds.   Pulmonary:      Effort: Pulmonary effort is normal.      Breath sounds: Normal breath sounds.   Abdominal:      General: Bowel sounds are normal.      Palpations: Abdomen is soft.   Musculoskeletal:      Cervical back: Normal range of motion and neck supple.   Lymphadenopathy:      Cervical: No cervical adenopathy.   Skin:     General: Skin is warm.   Neurological:      Mental Status: She is alert and oriented to person, place, and time.       Dharmesh Humphries MD

## 2024-01-15 NOTE — ASSESSMENT & PLAN NOTE
It was discussed with patient about using Wegovy every 2 to 3 weeks and to add protein to her diet.  Continue to monitor her weight.  Come back in 3 months.

## 2024-01-15 NOTE — ASSESSMENT & PLAN NOTE
She cut down on Wellbutrin she is doing well.  Stable without medications.  We will continue to monitor.

## 2024-02-07 ENCOUNTER — TELEPHONE (OUTPATIENT)
Age: 54
End: 2024-02-07

## 2024-02-07 NOTE — TELEPHONE ENCOUNTER
[facility] called in for a prior auth for her medication (wegovy 2.4 mg pen ). It would have to be initiated through [employer]. prompt PA , it can be faxed to [phone]

## 2024-02-07 NOTE — TELEPHONE ENCOUNTER
PA for Wegovy 2.4 MG/0.75ML     Submitted via  []CMM-KEY   []Surescripts-Case ID #   []Faxed to plan   [x]Other website -ZzepgzLA-TrZlyvphdy-TQ: [id number]  []Phone call Case ID #     Office notes sent, clinical questions answered. Awaiting determination

## 2024-02-08 NOTE — TELEPHONE ENCOUNTER
PA for  Wegovy 2.4 MG/0.75ML  Approved   Date(s) approved 2/7/24-2/6/25  Case #    Patient advised by [x] MyChart Message                      [] Phone call       Pharmacy advised by [x]Fax                                     []Phone call    Approval letter scanned into Media Yes

## 2024-02-12 DIAGNOSIS — E66.09 CLASS 1 OBESITY DUE TO EXCESS CALORIES WITH SERIOUS COMORBIDITY AND BODY MASS INDEX (BMI) OF 31.0 TO 31.9 IN ADULT: ICD-10-CM

## 2024-02-12 RX ORDER — SEMAGLUTIDE 2.4 MG/.75ML
2.4 INJECTION, SOLUTION SUBCUTANEOUS WEEKLY
Qty: 9 ML | Refills: 0 | Status: SHIPPED | OUTPATIENT
Start: 2024-02-12 | End: 2024-05-12

## 2024-02-12 NOTE — TELEPHONE ENCOUNTER
Reason for call:   [x] Refill   [] Prior Auth  [] Other:     Office:   [x] PCP/Provider - Dharmesh Humphries   [] Specialty/Provider -     Medication: Wegovy    Dose/Frequency: 2.4 mg/0.7 mL inject 0.75mL under the skin once a week    Quantity: 9mL    Pharmacy: [facility]    Does the patient have enough for 3 days?   [] Yes   [x] No - Send as HP to POD

## 2024-02-20 ENCOUNTER — NURSE TRIAGE (OUTPATIENT)
Age: 54
End: 2024-02-20
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
## 2024-04-01 ENCOUNTER — RA CDI HCC (OUTPATIENT)
Dept: OTHER | Facility: HOSPITAL | Age: 54
End: 2024-04-01

## 2024-04-09 ENCOUNTER — OFFICE VISIT (OUTPATIENT)
Dept: FAMILY MEDICINE CLINIC | Facility: CLINIC | Age: 54
End: 2024-04-09
Payer: COMMERCIAL

## 2024-04-09 VITALS
OXYGEN SATURATION: 98 % | HEIGHT: 60 IN | WEIGHT: 130 LBS | DIASTOLIC BLOOD PRESSURE: 66 MMHG | BODY MASS INDEX: 25.52 KG/M2 | HEART RATE: 83 BPM | RESPIRATION RATE: 16 BRPM | SYSTOLIC BLOOD PRESSURE: 110 MMHG | TEMPERATURE: 97.1 F

## 2024-04-09 DIAGNOSIS — F41.9 ANXIETY: ICD-10-CM

## 2024-04-09 DIAGNOSIS — Z00.00 HEALTHCARE MAINTENANCE: ICD-10-CM

## 2024-04-09 DIAGNOSIS — E66.09 CLASS 1 OBESITY DUE TO EXCESS CALORIES WITH SERIOUS COMORBIDITY AND BODY MASS INDEX (BMI) OF 31.0 TO 31.9 IN ADULT: ICD-10-CM

## 2024-04-09 DIAGNOSIS — R11.0 NAUSEA: ICD-10-CM

## 2024-04-09 DIAGNOSIS — K58.1 IRRITABLE BOWEL SYNDROME WITH CONSTIPATION: ICD-10-CM

## 2024-04-09 DIAGNOSIS — E78.49 OTHER HYPERLIPIDEMIA: Primary | ICD-10-CM

## 2024-04-09 DIAGNOSIS — K21.9 GASTROESOPHAGEAL REFLUX DISEASE WITHOUT ESOPHAGITIS: ICD-10-CM

## 2024-04-09 DIAGNOSIS — J30.89 NON-SEASONAL ALLERGIC RHINITIS DUE TO OTHER ALLERGIC TRIGGER: ICD-10-CM

## 2024-04-09 DIAGNOSIS — F33.2 MDD (MAJOR DEPRESSIVE DISORDER), RECURRENT SEVERE, WITHOUT PSYCHOSIS (HCC): ICD-10-CM

## 2024-04-09 PROCEDURE — 99214 OFFICE O/P EST MOD 30 MIN: CPT | Performed by: FAMILY MEDICINE

## 2024-04-09 PROCEDURE — 99396 PREV VISIT EST AGE 40-64: CPT | Performed by: FAMILY MEDICINE

## 2024-04-09 RX ORDER — OMEPRAZOLE 40 MG/1
40 CAPSULE, DELAYED RELEASE ORAL DAILY
Qty: 90 CAPSULE | Refills: 2 | Status: SHIPPED | OUTPATIENT
Start: 2024-04-09

## 2024-04-09 RX ORDER — ONDANSETRON 4 MG/1
4 TABLET, ORALLY DISINTEGRATING ORAL EVERY 6 HOURS PRN
Qty: 24 TABLET | Refills: 1 | Status: SHIPPED | OUTPATIENT
Start: 2024-04-09

## 2024-04-09 RX ORDER — SEMAGLUTIDE 2.4 MG/.75ML
2.4 INJECTION, SOLUTION SUBCUTANEOUS WEEKLY
Qty: 9 ML | Refills: 0 | Status: SHIPPED | OUTPATIENT
Start: 2024-04-09 | End: 2024-07-08

## 2024-04-09 RX ORDER — AMOXICILLIN 500 MG/1
500 CAPSULE ORAL EVERY 8 HOURS SCHEDULED
COMMUNITY
Start: 2024-03-16

## 2024-04-09 NOTE — ASSESSMENT & PLAN NOTE
Discussed about immunizations, diet, exercise and safety measures.   IV ABX CONITUNED TO FLOOR AT THIS TIME

## 2024-04-09 NOTE — PROGRESS NOTES
Name: Kavitha Marx      : 1970      MRN: [id number]  Encounter Provider: Dharmesh Humphries MD  Encounter Date: 2024   Encounter department: [facility]    Assessment & Plan     1. Other hyperlipidemia  Assessment & Plan:  Patient with history of HLD. Controlled without medication per last lipid panel. Patient to continue with healthy diet and regular exercise. We will continue to monitor fasting lipid panel. Follow up visit in 3 months. Patient to complete repeat labs prior to follow up.     Orders:  -     Comprehensive metabolic panel; Future  -     CBC and differential; Future  -     Lipid Panel with Direct LDL reflex; Future  -     TSH, 3rd generation with Free T4 reflex; Future    2. Nausea  -     ondansetron (ZOFRAN-ODT) 4 mg disintegrating tablet; Take 1 tablet (4 mg total) by mouth every 6 (six) hours as needed for nausea or vomiting    3. Gastroesophageal reflux disease without esophagitis  Assessment & Plan:  Well-controlled on pantoprazole.  Continue same.  Will continue to monitor.    Orders:  -     omeprazole (PriLOSEC) 40 MG capsule; Take 1 capsule (40 mg total) by mouth daily    4. Irritable bowel syndrome with constipation  Assessment & Plan:  Stable on hyoscyamine.  Continue same.  Will continue to monitor.    Orders:  -     hyoscyamine (LEVSIN/SL) 0.125 mg SL tablet; Take 1 tablet (0.125 mg total) by mouth 2 (two) times a day    5. Non-seasonal allergic rhinitis due to other allergic trigger  Assessment & Plan:  Stable on her medications.  Continue same.  Will continue to monitor.      6. MDD (major depressive disorder), recurrent severe, without psychosis (HCC)  Assessment & Plan:  Stable without medications.  We will continue to monitor.      7. Anxiety  Assessment & Plan:  Stable.  Continue same.  We will continue to monitor.      8. Class 1 obesity due to excess calories with serious comorbidity and body mass index (BMI) of 31.0 to 31.9 in adult  -      Semaglutide-Weight Management (Wegovy) 2.4 MG/0.75ML; Inject 0.75 mL (2.4 mg total) under the skin once a week    9. BMI 25.0-25.9,adult  Assessment & Plan:  It was discussed with patient about using Wegovy every 2 to 3 weeks and to add protein to her diet.  Continue to monitor her weight.  Come back in 3 months.      10. Healthcare maintenance  Assessment & Plan:  Discussed about immunizations, diet, exercise and safety measures.           Subjective     She is here today for follow-up multiple medical problems.  She has been taking her medications.  She denies any side effects of her medications.  Her last blood work in January was stable.  She denies any other complaint today.    Anxiety  Patient reports no chest pain, dizziness, palpitations or shortness of breath.         Review of Systems   Constitutional:  Negative for chills and fever.   HENT:  Negative for trouble swallowing.    Eyes:  Negative for visual disturbance.   Respiratory:  Negative for cough and shortness of breath.    Cardiovascular:  Negative for chest pain, palpitations and leg swelling.   Gastrointestinal:  Negative for abdominal pain, constipation and diarrhea.   Endocrine: Negative for cold intolerance and heat intolerance.   Genitourinary:  Negative for difficulty urinating and dysuria.   Musculoskeletal:  Negative for gait problem.   Skin:  Negative for rash.   Neurological:  Negative for dizziness, tremors, seizures and headaches.   Hematological:  Negative for adenopathy.   Psychiatric/Behavioral:  Negative for behavioral problems.        Past Medical History:   Diagnosis Date   • Allergic    • Anxiety    • Asthma    • GI symptoms 05/27/2020   • Lactose intolerance      Past Surgical History:   Procedure Laterality Date   • BACK SURGERY  2012   • OVARIAN CYST REMOVAL Left 2008   • UPPER GASTROINTESTINAL ENDOSCOPY  04/03/2007    Gastritis-biopsy negative for H. pylori by [facility]     Family History   Problem Relation Age of Onset   •  Hypertension Father    • Hypertension Sister    • Lung cancer Sister    • Cancer Maternal Aunt    • Breast cancer additional onset Cousin    • Cancer Cousin      Social History     Socioeconomic History   • Marital status: /Civil Union     Spouse name: None   • Number of children: None   • Years of education: None   • Highest education level: None   Occupational History   • 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   Tobacco Use   • Smoking status: Never   • Smokeless tobacco: Never   • Tobacco comments:     no passive smoke exposure   Vaping Use   • Vaping status: Never Used   Substance and Sexual Activity   • Alcohol use: Never   • Drug use: Never   • Sexual activity: None   Other Topics Concern   • None   Social History Narrative   • None     Social Determinants of Health     Financial Resource Strain: Not on file   Food Insecurity: Not on file   Transportation Needs: Not on file   Physical Activity: Not on file   Stress: Not on file   Social Connections: Not on file   Intimate Partner Violence: Not on file   Housing Stability: Not on file     Current Outpatient Medications on File Prior to Visit   Medication Sig   • albuterol (PROVENTIL HFA,VENTOLIN HFA) 90 mcg/act inhaler albuterol sulfate HFA 90 mcg/actuation aerosol inhaler   • amoxicillin (AMOXIL) 500 mg capsule Take 500 mg by mouth every 8 (eight) hours   • azelastine (ASTELIN) 0.1 % nasal spray 1 spray into each nostril 2 (two) times a day Use in each nostril as directed   • cyclobenzaprine (FLEXERIL) 10 mg tablet Take 1 tablet (10 mg total) by mouth 3 (three) times a day as needed for muscle spasms   • diclofenac sodium (VOLTAREN) 50 mg EC tablet Take 50 mg by mouth 2 (two) times a day   • fexofenadine (ALLEGRA) 180 MG tablet Take 1 tablet (180 mg total) by mouth daily   • gabapentin (Neurontin) 100 mg capsule Take 1 capsule (100 mg total) by mouth 3 (three) times a day   • [DISCONTINUED] hyoscyamine (LEVSIN/SL) 0.125 mg SL tablet Take 1 tablet (0.125 mg total)  by mouth 2 (two) times a day   • [DISCONTINUED] ondansetron (ZOFRAN-ODT) 4 mg disintegrating tablet Take 1 tablet (4 mg total) by mouth every 6 (six) hours as needed for nausea or vomiting   • [DISCONTINUED] Semaglutide-Weight Management (Wegovy) 2.4 MG/0.75ML Inject 0.75 mL (2.4 mg total) under the skin once a week   • ketorolac (TORADOL) 10 mg tablet Take 1 tablet (10 mg total) by mouth every 6 (six) hours as needed for moderate pain (Patient not taking: Reported on 1/9/2024)   • LORazepam (ATIVAN) 0.5 mg tablet Take 1 tablet (0.5 mg total) by mouth every 8 (eight) hours as needed for anxiety (Patient not taking: Reported on 1/9/2024)   • [DISCONTINUED] omeprazole (PriLOSEC) 40 MG capsule Take 1 capsule (40 mg total) by mouth daily     Allergies   Allergen Reactions   • Acetaminophen Other (See Comments)   • Codeine GI Intolerance and Other (See Comments)   • Hydrocodone-Acetaminophen Other (See Comments) and Headache     Immunization History   Administered Date(s) Administered   • COVID-19 PFIZER VACCINE 0.3 ML IM 04/16/2021, 05/07/2021   • Td (adult), Unspecified 09/30/2012   • Tdap 09/30/2012       Objective     /66 (BP Location: Left arm, Patient Position: Sitting, Cuff Size: Adult)   Pulse 83   Temp (!) 97.1 °F (36.2 °C) (Tympanic)   Resp 16   Ht 5' (1.524 m)   Wt 59 kg (130 lb)   SpO2 98%   BMI 25.39 kg/m²     Physical Exam  Vitals and nursing note reviewed.   Constitutional:       Appearance: She is well-developed.   HENT:      Head: Normocephalic and atraumatic.   Eyes:      Pupils: Pupils are equal, round, and reactive to light.   Cardiovascular:      Rate and Rhythm: Normal rate and regular rhythm.      Heart sounds: Normal heart sounds.   Pulmonary:      Effort: Pulmonary effort is normal.      Breath sounds: Normal breath sounds.   Abdominal:      General: Bowel sounds are normal.      Palpations: Abdomen is soft.   Musculoskeletal:      Cervical back: Normal range of motion and neck  supple.   Lymphadenopathy:      Cervical: No cervical adenopathy.   Skin:     General: Skin is warm.   Neurological:      Mental Status: She is alert and oriented to person, place, and time.       Dharmesh Humphries MD

## 2024-04-14 ENCOUNTER — OFFICE VISIT (OUTPATIENT)
Dept: URGENT CARE | Age: 54
End: 2024-04-14
Payer: COMMERCIAL

## 2024-04-14 VITALS
HEIGHT: 60 IN | TEMPERATURE: 98.2 F | WEIGHT: 125 LBS | BODY MASS INDEX: 24.54 KG/M2 | DIASTOLIC BLOOD PRESSURE: 74 MMHG | SYSTOLIC BLOOD PRESSURE: 119 MMHG | OXYGEN SATURATION: 99 % | HEART RATE: 92 BPM | RESPIRATION RATE: 18 BRPM

## 2024-04-14 DIAGNOSIS — S05.02XA ABRASION OF LEFT CORNEA, INITIAL ENCOUNTER: Primary | ICD-10-CM

## 2024-04-14 PROCEDURE — 99213 OFFICE O/P EST LOW 20 MIN: CPT | Performed by: PHYSICIAN ASSISTANT

## 2024-04-14 RX ORDER — ERYTHROMYCIN 5 MG/G
0.5 OINTMENT OPHTHALMIC
Qty: 3.5 G | Refills: 0 | Status: SHIPPED | OUTPATIENT
Start: 2024-04-14

## 2024-04-14 RX ORDER — TOBRAMYCIN 3 MG/ML
1 SOLUTION/ DROPS OPHTHALMIC 4 TIMES DAILY
Qty: 5 ML | Refills: 0 | Status: SHIPPED | OUTPATIENT
Start: 2024-04-14

## 2024-04-14 NOTE — PROGRESS NOTES
[facility]        NAME: Kavitha Marx is a 53 y.o. female  : 1970    MRN: [id number]  DATE: 2024  TIME: 12:21 PM    /74   Pulse 92   Temp 98.2 °F (36.8 °C)   Resp 18   Ht 5' (1.524 m)   Wt 56.7 kg (125 lb)   SpO2 99%   BMI 24.41 kg/m²     Assessment and Plan   Abrasion of left cornea, initial encounter [S05.02XA]  1. Abrasion of left cornea, initial encounter  tobramycin (TOBREX) 0.3 % SOLN    erythromycin (ILOTYCIN) ophthalmic ointment            Patient Instructions       Follow up with PCP in 3-5 days.  Proceed to  ER if symptoms worsen.    Chief Complaint     Chief Complaint   Patient presents with    Eye Pain     Left eye pain began today         History of Present Illness       Pt with left eye injury,  pt putting sun glasses on and price tag poked her eye     Eye Pain         Review of Systems   Review of Systems   Constitutional: Negative.    HENT: Negative.     Eyes:  Positive for pain.   Respiratory: Negative.     Cardiovascular: Negative.    Gastrointestinal: Negative.    Endocrine: Negative.    Genitourinary: Negative.    Musculoskeletal: Negative.    Skin: Negative.    Allergic/Immunologic: Negative.    Neurological: Negative.    Hematological: Negative.    Psychiatric/Behavioral: Negative.     All other systems reviewed and are negative.        Current Medications       Current Outpatient Medications:     albuterol (PROVENTIL HFA,VENTOLIN HFA) 90 mcg/act inhaler, albuterol sulfate HFA 90 mcg/actuation aerosol inhaler, Disp: , Rfl:     azelastine (ASTELIN) 0.1 % nasal spray, 1 spray into each nostril 2 (two) times a day Use in each nostril as directed, Disp: 90 mL, Rfl: 3    cyclobenzaprine (FLEXERIL) 10 mg tablet, Take 1 tablet (10 mg total) by mouth 3 (three) times a day as needed for muscle spasms, Disp: 30 tablet, Rfl: 0    diclofenac sodium (VOLTAREN) 50 mg EC tablet, Take 50 mg by mouth 2 (two) times a day, Disp: , Rfl:     erythromycin (ILOTYCIN) ophthalmic  ointment, Administer 0.5 inches into the left eye daily at bedtime, Disp: 3.5 g, Rfl: 0    fexofenadine (ALLEGRA) 180 MG tablet, Take 1 tablet (180 mg total) by mouth daily, Disp: 90 tablet, Rfl: 2    gabapentin (Neurontin) 100 mg capsule, Take 1 capsule (100 mg total) by mouth 3 (three) times a day, Disp: 90 capsule, Rfl: 0    omeprazole (PriLOSEC) 40 MG capsule, Take 1 capsule (40 mg total) by mouth daily, Disp: 90 capsule, Rfl: 2    ondansetron (ZOFRAN-ODT) 4 mg disintegrating tablet, Take 1 tablet (4 mg total) by mouth every 6 (six) hours as needed for nausea or vomiting, Disp: 24 tablet, Rfl: 1    Semaglutide-Weight Management (Wegovy) 2.4 MG/0.75ML, Inject 0.75 mL (2.4 mg total) under the skin once a week, Disp: 9 mL, Rfl: 0    tobramycin (TOBREX) 0.3 % SOLN, Administer 1 drop into the left eye 4 (four) times a day, Disp: 5 mL, Rfl: 0    amoxicillin (AMOXIL) 500 mg capsule, Take 500 mg by mouth every 8 (eight) hours (Patient not taking: Reported on 4/14/2024), Disp: , Rfl:     hyoscyamine (LEVSIN/SL) 0.125 mg SL tablet, Take 1 tablet (0.125 mg total) by mouth 2 (two) times a day (Patient not taking: Reported on 4/14/2024), Disp: 180 tablet, Rfl: 3    ketorolac (TORADOL) 10 mg tablet, Take 1 tablet (10 mg total) by mouth every 6 (six) hours as needed for moderate pain (Patient not taking: Reported on 1/9/2024), Disp: 20 tablet, Rfl: 0    LORazepam (ATIVAN) 0.5 mg tablet, Take 1 tablet (0.5 mg total) by mouth every 8 (eight) hours as needed for anxiety (Patient not taking: Reported on 1/9/2024), Disp: 30 tablet, Rfl: 0    Current Allergies     Allergies as of 04/14/2024 - Reviewed 04/14/2024   Allergen Reaction Noted    Acetaminophen Other (See Comments) 10/02/2023    Codeine GI Intolerance and Other (See Comments) 07/14/2014    Hydrocodone-acetaminophen Other (See Comments) and Headache 11/05/2015            The following portions of the patient's history were reviewed and updated as appropriate: allergies,  current medications, past family history, past medical history, past social history, past surgical history and problem list.     Past Medical History:   Diagnosis Date    Allergic     Anxiety     Asthma     GI symptoms 05/27/2020    Lactose intolerance        Past Surgical History:   Procedure Laterality Date    BACK SURGERY  2012    OVARIAN CYST REMOVAL Left 2008    UPPER GASTROINTESTINAL ENDOSCOPY  04/03/2007    Gastritis-biopsy negative for H. pylori by [facility]       Family History   Problem Relation Age of Onset    Hypertension Father     Hypertension Sister     Lung cancer Sister     Cancer Maternal Aunt     Breast cancer additional onset Cousin     Cancer Cousin          Medications have been verified.        Objective   /74   Pulse 92   Temp 98.2 °F (36.8 °C)   Resp 18   Ht 5' (1.524 m)   Wt 56.7 kg (125 lb)   SpO2 99%   BMI 24.41 kg/m²        Physical Exam     Physical Exam  Vitals and nursing note reviewed.   Constitutional:       Appearance: Normal appearance. She is normal weight.   HENT:      Head: Normocephalic and atraumatic.      Right Ear: Tympanic membrane, ear canal and external ear normal.      Left Ear: Tympanic membrane, ear canal and external ear normal.      Nose: Nose normal.      Mouth/Throat:      Mouth: Mucous membranes are moist.   Eyes:      Extraocular Movements: Extraocular movements intact.      Pupils: Pupils are equal, round, and reactive to light.      Comments: Left 20/25 right 20/30    + red reflex anterior chamber wnl  + fluorosceine uptake x 2 superficial 3mm each over left lateral  iris notover pupil no fluorosceine penetration seen  no f/o in cornea or under either lid  lids wnl   conj slight injected    Cardiovascular:      Rate and Rhythm: Normal rate and regular rhythm.      Pulses: Normal pulses.      Heart sounds: Normal heart sounds.   Pulmonary:      Breath sounds: Normal breath sounds.   Musculoskeletal:         General: Normal range of motion.       Cervical back: Normal range of motion and neck supple.   Skin:     General: Skin is warm.   Neurological:      Mental Status: She is alert.

## 2024-05-09 PROBLEM — Z00.00 HEALTHCARE MAINTENANCE: Status: RESOLVED | Noted: 2022-05-13 | Resolved: 2024-05-09

## 2024-07-29 ENCOUNTER — RA CDI HCC (OUTPATIENT)
Dept: OTHER | Facility: HOSPITAL | Age: 54
End: 2024-07-29

## 2024-07-29 NOTE — PROGRESS NOTES
HCC coding opportunities          Chart Reviewed number of suggestions sent to Provider: 1     Patients Insurance        Commercial Insurance: [employer]       J45.20: Mild intermittent asthma without complication      Last assessed on 7/3/2023 - please review and assess and document per MEAT if applicable for 2024   Thank you for letting us participate in your care while at [facility].  You were admitted to the hospital for alcohol detoxification and/or withdrawal.  While here you never experienced symptoms of full withdrawal and did not require medicinal treatment.  You are seen by chemical dependency team and also evaluated by psychiatry team.  With shared decision making between your care team and you ,you decided to be discharged to [facility].  You will be discharged on 6- to [facility] with your medications delivered at bedside and with transportation arranged.  Please continue to follow-up with your primary care physician and your outpatient psychiatrist.  Please continue to take your medications as prescribed.  We wish you the best of luck with your care going forward.    Medications:  Trazodone 100 mg at night - insomnia  Hydroxyzine 25 mg every 6 hours as needed for anxiety  Prazosin 2 mg daily  Topiramate 25 mg daily  Lexapro 20 mg daily  Levothyroxine 125 mcg every morning before breakfast - hypothyroidism  Gabapentin 300 mg every 8 hours  Ferrous sulfate 325 mg every other day - anemia  Melatonin 6 to 9 mg every night as needed -  insomnia

## 2024-08-09 ENCOUNTER — OFFICE VISIT (OUTPATIENT)
Dept: FAMILY MEDICINE CLINIC | Facility: CLINIC | Age: 54
End: 2024-08-09
Payer: COMMERCIAL

## 2024-08-09 VITALS
TEMPERATURE: 97.2 F | SYSTOLIC BLOOD PRESSURE: 122 MMHG | BODY MASS INDEX: 25.91 KG/M2 | HEART RATE: 81 BPM | RESPIRATION RATE: 16 BRPM | DIASTOLIC BLOOD PRESSURE: 84 MMHG | WEIGHT: 132 LBS | HEIGHT: 60 IN | OXYGEN SATURATION: 98 %

## 2024-08-09 DIAGNOSIS — K64.8 OTHER HEMORRHOIDS: ICD-10-CM

## 2024-08-09 DIAGNOSIS — B35.4 TINEA CORPORIS: ICD-10-CM

## 2024-08-09 DIAGNOSIS — M48.061 SPINAL STENOSIS OF LUMBAR REGION, UNSPECIFIED WHETHER NEUROGENIC CLAUDICATION PRESENT: ICD-10-CM

## 2024-08-09 DIAGNOSIS — F33.2 MDD (MAJOR DEPRESSIVE DISORDER), RECURRENT SEVERE, WITHOUT PSYCHOSIS (HCC): ICD-10-CM

## 2024-08-09 DIAGNOSIS — K21.9 GASTROESOPHAGEAL REFLUX DISEASE WITHOUT ESOPHAGITIS: ICD-10-CM

## 2024-08-09 DIAGNOSIS — K58.1 IRRITABLE BOWEL SYNDROME WITH CONSTIPATION: ICD-10-CM

## 2024-08-09 DIAGNOSIS — R11.0 NAUSEA: ICD-10-CM

## 2024-08-09 DIAGNOSIS — E78.49 OTHER HYPERLIPIDEMIA: ICD-10-CM

## 2024-08-09 DIAGNOSIS — J45.20 MILD INTERMITTENT ASTHMA WITHOUT COMPLICATION: Primary | ICD-10-CM

## 2024-08-09 PROCEDURE — 99214 OFFICE O/P EST MOD 30 MIN: CPT | Performed by: FAMILY MEDICINE

## 2024-08-09 RX ORDER — SEMAGLUTIDE 2.4 MG/.75ML
INJECTION, SOLUTION SUBCUTANEOUS
Qty: 9 ML | Refills: 0 | Status: SHIPPED | OUTPATIENT
Start: 2024-08-09

## 2024-08-09 RX ORDER — HYDROCORTISONE ACETATE 25 MG/1
25 SUPPOSITORY RECTAL 2 TIMES DAILY
Qty: 12 SUPPOSITORY | Refills: 0 | Status: SHIPPED | OUTPATIENT
Start: 2024-08-09

## 2024-08-09 RX ORDER — ONDANSETRON 4 MG/1
4 TABLET, ORALLY DISINTEGRATING ORAL EVERY 6 HOURS PRN
Qty: 24 TABLET | Refills: 1 | Status: SHIPPED | OUTPATIENT
Start: 2024-08-09 | End: 2024-08-16 | Stop reason: SDUPTHER

## 2024-08-09 RX ORDER — CLOTRIMAZOLE AND BETAMETHASONE DIPROPIONATE 10; .64 MG/G; MG/G
CREAM TOPICAL 2 TIMES DAILY
Qty: 45 G | Refills: 1 | Status: SHIPPED | OUTPATIENT
Start: 2024-08-09

## 2024-08-09 RX ORDER — GABAPENTIN 300 MG/1
300 CAPSULE ORAL 3 TIMES DAILY
COMMUNITY
Start: 2024-07-30

## 2024-08-09 NOTE — ASSESSMENT & PLAN NOTE
He was given prescription for Anusol suppositories to use 1 twice a day for 6 days.  I will consider referral to see rectal surgeon if no improvement.

## 2024-08-09 NOTE — PROGRESS NOTES
Ambulatory Visit  Name: Kavitha Marx      : 1970      MRN: [id number]  Encounter Provider: Dharmesh Humphries MD  Encounter Date: 2024   Encounter department: [facility]    Assessment & Plan   1. Mild intermittent asthma without complication  Assessment & Plan:  Stable.  Continue same.  We will continue to monitor.  2. Gastroesophageal reflux disease without esophagitis  Assessment & Plan:  Well-controlled on pantoprazole.  Continue same.  Will continue to monitor.  3. Irritable bowel syndrome with constipation  Assessment & Plan:  Stable on hyoscyamine.  Continue same.  Will continue to monitor.  4. Other hyperlipidemia  Assessment & Plan:  Patient with history of HLD. Controlled without medication per last lipid panel. Patient to continue with healthy diet and regular exercise. We will continue to monitor fasting lipid panel. Follow up visit in 3 months. Patient to complete repeat labs prior to follow up.   Orders:  -     CBC and differential; Future  -     Comprehensive metabolic panel; Future  -     Lipid Panel with Direct LDL reflex; Future  -     TSH, 3rd generation with Free T4 reflex; Future  5. Nausea  -     ondansetron (ZOFRAN-ODT) 4 mg disintegrating tablet; Take 1 tablet (4 mg total) by mouth every 6 (six) hours as needed for nausea or vomiting  6. BMI 27.0-27.9,adult  -     Semaglutide-Weight Management (Wegovy) 2.4 MG/0.75ML; Inject 2.4 mg under the skin weekly  7. MDD (major depressive disorder), recurrent severe, without psychosis (HCC)  Assessment & Plan:  Stable without medications.  We will continue to monitor.  8. Tinea corporis  Assessment & Plan:  She was given prescription for Lotrisone cream.  I will consider referral to dermatologist if not improving.  Orders:  -     clotrimazole-betamethasone (LOTRISONE) 1-0.05 % cream; Apply topically 2 (two) times a day  9. Other hemorrhoids  Assessment & Plan:  He was given prescription for Anusol suppositories to use  1 twice a day for 6 days.  I will consider referral to see rectal surgeon if no improvement.  Orders:  -     hydrocortisone (ANUSOL-HC) 25 mg suppository; Insert 1 suppository (25 mg total) into the rectum 2 (two) times a day  10. Spinal stenosis of lumbar region, unspecified whether neurogenic claudication present  Assessment & Plan:  Not well-controlled.  Continue on her gabapentin and continue to follow-up with back specialist.         History of Present Illness     She is here today for follow-up multiple medical problems.  She has been taking her medications.  She denies any side effect.  She continues on Wegovy and has been maintaining her weight loss.  She is requesting refills for Wegovy and Zofran.  She has been having problems with her lower back and following with back specialist.  She has been receiving injections and had recent MRI.  She complained of rash on her right hand, left arm and chest.  Also complains of having problems with hemorrhoids.    Anxiety  Patient reports no chest pain, dizziness, palpitations or shortness of breath.         Review of Systems   Constitutional:  Negative for chills and fever.   HENT:  Negative for trouble swallowing.    Eyes:  Negative for visual disturbance.   Respiratory:  Negative for cough and shortness of breath.    Cardiovascular:  Negative for chest pain, palpitations and leg swelling.   Gastrointestinal:  Positive for rectal pain. Negative for abdominal pain, constipation and diarrhea.   Endocrine: Negative for cold intolerance and heat intolerance.   Genitourinary:  Negative for difficulty urinating and dysuria.   Musculoskeletal:  Positive for back pain. Negative for gait problem.   Skin:  Positive for rash.   Neurological:  Negative for dizziness, tremors, seizures and headaches.   Hematological:  Negative for adenopathy.   Psychiatric/Behavioral:  Negative for behavioral problems.      Past Medical History:   Diagnosis Date   • Allergic    • Anxiety    •  Asthma    • GI symptoms 05/27/2020   • Lactose intolerance      Past Surgical History:   Procedure Laterality Date   • BACK SURGERY  2012   • OVARIAN CYST REMOVAL Left 2008   • UPPER GASTROINTESTINAL ENDOSCOPY  04/03/2007    Gastritis-biopsy negative for H. pylori by [facility]     Family History   Problem Relation Age of Onset   • Hypertension Father    • Hypertension Sister    • Lung cancer Sister    • Cancer Maternal Aunt    • Breast cancer additional onset Cousin    • Cancer Cousin      Social History     Tobacco Use   • Smoking status: Never   • Smokeless tobacco: Never   • Tobacco comments:     no passive smoke exposure   Vaping Use   • Vaping status: Never Used   Substance and Sexual Activity   • Alcohol use: Never   • Drug use: Never   • Sexual activity: Not on file     Current Outpatient Medications on File Prior to Visit   Medication Sig   • albuterol (PROVENTIL HFA,VENTOLIN HFA) 90 mcg/act inhaler albuterol sulfate HFA 90 mcg/actuation aerosol inhaler   • azelastine (ASTELIN) 0.1 % nasal spray 1 spray into each nostril 2 (two) times a day Use in each nostril as directed   • cyclobenzaprine (FLEXERIL) 10 mg tablet Take 1 tablet (10 mg total) by mouth 3 (three) times a day as needed for muscle spasms   • diclofenac sodium (VOLTAREN) 50 mg EC tablet Take 50 mg by mouth 2 (two) times a day   • fexofenadine (ALLEGRA) 180 MG tablet Take 1 tablet (180 mg total) by mouth daily   • gabapentin (NEURONTIN) 300 mg capsule Take 300 mg by mouth 3 (three) times a day   • omeprazole (PriLOSEC) 40 MG capsule Take 1 capsule (40 mg total) by mouth daily   • tobramycin (TOBREX) 0.3 % SOLN Administer 1 drop into the left eye 4 (four) times a day   • [DISCONTINUED] ondansetron (ZOFRAN-ODT) 4 mg disintegrating tablet Take 1 tablet (4 mg total) by mouth every 6 (six) hours as needed for nausea or vomiting   • amoxicillin (AMOXIL) 500 mg capsule Take 500 mg by mouth every 8 (eight) hours (Patient not taking: Reported on 4/14/2024)   •  erythromycin (ILOTYCIN) ophthalmic ointment Administer 0.5 inches into the left eye daily at bedtime (Patient not taking: Reported on 8/9/2024)   • gabapentin (Neurontin) 100 mg capsule Take 1 capsule (100 mg total) by mouth 3 (three) times a day (Patient not taking: Reported on 8/9/2024)   • hyoscyamine (LEVSIN/SL) 0.125 mg SL tablet Take 1 tablet (0.125 mg total) by mouth 2 (two) times a day (Patient not taking: Reported on 4/14/2024)   • LORazepam (ATIVAN) 0.5 mg tablet Take 1 tablet (0.5 mg total) by mouth every 8 (eight) hours as needed for anxiety (Patient not taking: Reported on 1/9/2024)   • [DISCONTINUED] ketorolac (TORADOL) 10 mg tablet Take 1 tablet (10 mg total) by mouth every 6 (six) hours as needed for moderate pain (Patient not taking: Reported on 1/9/2024)     Allergies   Allergen Reactions   • Acetaminophen Other (See Comments)   • Codeine GI Intolerance and Other (See Comments)   • Hydrocodone-Acetaminophen Other (See Comments) and Headache     Immunization History   Administered Date(s) Administered   • COVID-19 PFIZER VACCINE 0.3 ML IM 04/16/2021, 05/07/2021   • Td (adult), Unspecified 09/30/2012   • Tdap 09/30/2012     Objective     /84 (BP Location: Left arm, Patient Position: Sitting, Cuff Size: Adult)   Pulse 81   Temp (!) 97.2 °F (36.2 °C) (Tympanic)   Resp 16   Ht 5' (1.524 m)   Wt 59.9 kg (132 lb)   SpO2 98%   BMI 25.78 kg/m²     Physical Exam  Vitals and nursing note reviewed.   Constitutional:       Appearance: She is well-developed.   HENT:      Head: Normocephalic and atraumatic.   Eyes:      Pupils: Pupils are equal, round, and reactive to light.   Cardiovascular:      Rate and Rhythm: Normal rate and regular rhythm.      Heart sounds: Normal heart sounds.   Pulmonary:      Effort: Pulmonary effort is normal.      Breath sounds: Normal breath sounds.   Abdominal:      General: Bowel sounds are normal.      Palpations: Abdomen is soft.   Musculoskeletal:         General:  Tenderness present.      Cervical back: Normal range of motion and neck supple.   Lymphadenopathy:      Cervical: No cervical adenopathy.   Skin:     General: Skin is warm.      Findings: Rash present.   Neurological:      Mental Status: She is alert and oriented to person, place, and time.

## 2024-08-09 NOTE — ASSESSMENT & PLAN NOTE
She was given prescription for Lotrisone cream.  I will consider referral to dermatologist if not improving.

## 2024-08-16 DIAGNOSIS — R11.0 NAUSEA: ICD-10-CM

## 2024-08-16 RX ORDER — ONDANSETRON 4 MG/1
4 TABLET, ORALLY DISINTEGRATING ORAL EVERY 6 HOURS PRN
Qty: 24 TABLET | Refills: 0 | Status: SHIPPED | OUTPATIENT
Start: 2024-08-16

## 2024-08-16 NOTE — TELEPHONE ENCOUNTER
Last seen 8/9/24 refill sent to provider as [facility] in [place]

## 2024-08-16 NOTE — TELEPHONE ENCOUNTER
[facility] did not fill RX (8/9) because not 90 day supply.  Please write script for 90 day and if NOT we need the script to go to [facility]      Medication: zofran    Dose/Frequency: 4mg every six hours as needed    Quantity: 90    Pharmacy: [facility]    Office:   [x] PCP/Provider -   [] Speciality/Provider -     Does the patient have enough for 3 days?   [x] Yes   [] No - Send as HP to POD

## 2024-09-04 ENCOUNTER — TELEPHONE (OUTPATIENT)
Age: 54
End: 2024-09-04

## 2024-09-04 NOTE — TELEPHONE ENCOUNTER
09/04/24    Patient called office requesting an appt with Dr. Keith, Recommended by her Niece.    Asked patient the reason for the visit, and patient stated that on 08/23/24 she was at the ED because she passed out (SYNCOPE).    Patient was seen at the [facility] and was referred to see Neurology.    Patient was already scheduled to see [facility] NEUROLOGIST for 09/12/24.    I asked patient if there was particular reason why she needed to see Neurology Besides for the Syncope, and patient stated that she honestly did not know why she had to follow up with Neurology.    I advised patient to please keep her already scheduled appt with [facility] Neurologist for 09/12/24 since it was sooner, and that our process to schedule patient for Syncope needs to see Cardiology first, get cleared by Cardiology, and then schedule with [facility] Neurology.     Patient UNDERSTOOD, and AGREED to keep [facility] Neurology Appt.       Any questions, please contact patient.  Thank You.

## 2024-09-18 ENCOUNTER — TELEPHONE (OUTPATIENT)
Age: 54
End: 2024-09-18

## 2024-09-18 NOTE — TELEPHONE ENCOUNTER
"Patient states she has a form that was mailed to her by Christy and is asking for the doctor to fill it out to clear her to drive. She states this is because of her possible seizure disorder. Advised patient this might need to be filled out by neurology. No appts available with Dr. SHAIKH for her to discuss.     :Loss of Consciousness Form\"  "

## 2024-09-26 ENCOUNTER — TELEPHONE (OUTPATIENT)
Age: 54
End: 2024-09-26

## 2024-09-26 NOTE — TELEPHONE ENCOUNTER
Kamilla from [facility] called to verify that Kavitha had a pre-op appointment scheduled. I was able to verify that the patient did have an appointment scheduled.

## 2024-09-28 ENCOUNTER — APPOINTMENT (OUTPATIENT)
Dept: LAB | Age: 54
End: 2024-09-28
Payer: COMMERCIAL

## 2024-09-28 DIAGNOSIS — D68.9 BLOOD CLOTTING DISORDER (HCC): ICD-10-CM

## 2024-09-28 DIAGNOSIS — Z01.818 OTHER SPECIFIED PRE-OPERATIVE EXAMINATION: ICD-10-CM

## 2024-09-28 DIAGNOSIS — E78.49 OTHER HYPERLIPIDEMIA: ICD-10-CM

## 2024-09-28 PROCEDURE — 85730 THROMBOPLASTIN TIME PARTIAL: CPT

## 2024-09-28 PROCEDURE — 80053 COMPREHEN METABOLIC PANEL: CPT

## 2024-09-28 PROCEDURE — 85610 PROTHROMBIN TIME: CPT

## 2024-09-28 PROCEDURE — 85025 COMPLETE CBC W/AUTO DIFF WBC: CPT

## 2024-09-28 PROCEDURE — 36415 COLL VENOUS BLD VENIPUNCTURE: CPT

## 2024-09-28 PROCEDURE — 84443 ASSAY THYROID STIM HORMONE: CPT

## 2024-09-28 PROCEDURE — 80061 LIPID PANEL: CPT

## 2024-09-29 LAB
ALBUMIN SERPL BCG-MCNC: 4.1 G/DL (ref 3.5–5)
ALP SERPL-CCNC: 47 U/L (ref 34–104)
ALT SERPL W P-5'-P-CCNC: 9 U/L (ref 7–52)
ANION GAP SERPL CALCULATED.3IONS-SCNC: 7 MMOL/L (ref 4–13)
APTT PPP: 32 SECONDS (ref 23–34)
AST SERPL W P-5'-P-CCNC: 12 U/L (ref 13–39)
BASOPHILS # BLD AUTO: 0.05 THOUSANDS/ÂΜL (ref 0–0.1)
BASOPHILS NFR BLD AUTO: 1 % (ref 0–1)
BILIRUB SERPL-MCNC: 0.56 MG/DL (ref 0.2–1)
BUN SERPL-MCNC: 12 MG/DL (ref 5–25)
CALCIUM SERPL-MCNC: 9.1 MG/DL (ref 8.4–10.2)
CHLORIDE SERPL-SCNC: 107 MMOL/L (ref 96–108)
CHOLEST SERPL-MCNC: 140 MG/DL
CO2 SERPL-SCNC: 28 MMOL/L (ref 21–32)
CREAT SERPL-MCNC: 0.63 MG/DL (ref 0.6–1.3)
EOSINOPHIL # BLD AUTO: 0.11 THOUSAND/ÂΜL (ref 0–0.61)
EOSINOPHIL NFR BLD AUTO: 2 % (ref 0–6)
ERYTHROCYTE [DISTWIDTH] IN BLOOD BY AUTOMATED COUNT: 12.1 % (ref 11.6–15.1)
GFR SERPL CREATININE-BSD FRML MDRD: 100 ML/MIN/1.73SQ M
GLUCOSE P FAST SERPL-MCNC: 75 MG/DL (ref 65–99)
HCT VFR BLD AUTO: 39.3 % (ref 34.8–46.1)
HDLC SERPL-MCNC: 59 MG/DL
HGB BLD-MCNC: 13.1 G/DL (ref 11.5–15.4)
IMM GRANULOCYTES # BLD AUTO: 0.04 THOUSAND/UL (ref 0–0.2)
IMM GRANULOCYTES NFR BLD AUTO: 1 % (ref 0–2)
INR PPP: 1.08 (ref 0.85–1.19)
LDLC SERPL CALC-MCNC: 70 MG/DL (ref 0–100)
LYMPHOCYTES # BLD AUTO: 1.54 THOUSANDS/ÂΜL (ref 0.6–4.47)
LYMPHOCYTES NFR BLD AUTO: 24 % (ref 14–44)
MCH RBC QN AUTO: 31.9 PG (ref 26.8–34.3)
MCHC RBC AUTO-ENTMCNC: 33.3 G/DL (ref 31.4–37.4)
MCV RBC AUTO: 96 FL (ref 82–98)
MONOCYTES # BLD AUTO: 0.38 THOUSAND/ÂΜL (ref 0.17–1.22)
MONOCYTES NFR BLD AUTO: 6 % (ref 4–12)
NEUTROPHILS # BLD AUTO: 4.19 THOUSANDS/ÂΜL (ref 1.85–7.62)
NEUTS SEG NFR BLD AUTO: 66 % (ref 43–75)
NRBC BLD AUTO-RTO: 0 /100 WBCS
PLATELET # BLD AUTO: 363 THOUSANDS/UL (ref 149–390)
PMV BLD AUTO: 10.7 FL (ref 8.9–12.7)
POTASSIUM SERPL-SCNC: 4.4 MMOL/L (ref 3.5–5.3)
PROT SERPL-MCNC: 6.3 G/DL (ref 6.4–8.4)
PROTHROMBIN TIME: 14.3 SECONDS (ref 12.3–15)
RBC # BLD AUTO: 4.11 MILLION/UL (ref 3.81–5.12)
SODIUM SERPL-SCNC: 142 MMOL/L (ref 135–147)
TRIGL SERPL-MCNC: 54 MG/DL
TSH SERPL DL<=0.05 MIU/L-ACNC: 0.52 UIU/ML (ref 0.45–4.5)
WBC # BLD AUTO: 6.31 THOUSAND/UL (ref 4.31–10.16)

## 2024-10-04 ENCOUNTER — OFFICE VISIT (OUTPATIENT)
Dept: FAMILY MEDICINE CLINIC | Facility: CLINIC | Age: 54
End: 2024-10-04
Payer: COMMERCIAL

## 2024-10-04 VITALS
WEIGHT: 140.8 LBS | HEIGHT: 60 IN | HEART RATE: 98 BPM | TEMPERATURE: 97.6 F | RESPIRATION RATE: 16 BRPM | BODY MASS INDEX: 27.64 KG/M2 | OXYGEN SATURATION: 99 %

## 2024-10-04 DIAGNOSIS — M48.062 SPINAL STENOSIS OF LUMBAR REGION WITH NEUROGENIC CLAUDICATION: Primary | ICD-10-CM

## 2024-10-04 DIAGNOSIS — D68.9 BLOOD COAGULATION DISORDER (HCC): ICD-10-CM

## 2024-10-04 DIAGNOSIS — R11.0 NAUSEA: ICD-10-CM

## 2024-10-04 DIAGNOSIS — R56.9 SEIZURE (HCC): ICD-10-CM

## 2024-10-04 DIAGNOSIS — Z01.818 PRE-OP EXAMINATION: ICD-10-CM

## 2024-10-04 PROCEDURE — 93000 ELECTROCARDIOGRAM COMPLETE: CPT | Performed by: FAMILY MEDICINE

## 2024-10-04 PROCEDURE — 99214 OFFICE O/P EST MOD 30 MIN: CPT | Performed by: FAMILY MEDICINE

## 2024-10-04 RX ORDER — ONDANSETRON 4 MG/1
4 TABLET, ORALLY DISINTEGRATING ORAL EVERY 6 HOURS PRN
Qty: 24 TABLET | Refills: 0 | Status: SHIPPED | OUTPATIENT
Start: 2024-10-04

## 2024-10-04 RX ORDER — ONDANSETRON 4 MG/1
4 TABLET, ORALLY DISINTEGRATING ORAL EVERY 6 HOURS PRN
Qty: 24 TABLET | Refills: 0 | Status: SHIPPED | OUTPATIENT
Start: 2024-10-04 | End: 2024-10-04 | Stop reason: SDUPTHER

## 2024-10-04 NOTE — ASSESSMENT & PLAN NOTE
Orders:    ondansetron (ZOFRAN-ODT) 4 mg disintegrating tablet; Take 1 tablet (4 mg total) by mouth every 6 (six) hours as needed for nausea or vomiting

## 2024-10-04 NOTE — ASSESSMENT & PLAN NOTE
EKG done in office came back normal sinus rhythm.  Blood work came back acceptable.  The patient is an acceptable risk for the proposed procedure.  She was told to stop Wegovy 1 week before procedure.    Orders:    POCT ECG

## 2024-10-04 NOTE — PROGRESS NOTES
Ambulatory Visit  Name: Kavitha Marx      : 1970      MRN: [id number]  Encounter Provider: Dharmesh Humphries MD  Encounter Date: 10/4/2024   Encounter department: [facility]    Assessment & Plan  Spinal stenosis of lumbar region with neurogenic claudication  She is going for back surgery on .         Pre-op examination  EKG done in office came back normal sinus rhythm.  Blood work came back acceptable.  The patient is an acceptable risk for the proposed procedure.  She was told to stop Wegovy 1 week before procedure.    Orders:    POCT ECG    Blood coagulation disorder (HCC)  Continue to monitor         Seizure (HCC)  Reaction to Neurontin but resolved since then.         Nausea    Orders:    ondansetron (ZOFRAN-ODT) 4 mg disintegrating tablet; Take 1 tablet (4 mg total) by mouth every 6 (six) hours as needed for nausea or vomiting         History of Present Illness     She is here today for preop clearance for back surgery.  She has been taking her medications.  She stated she was having problems with nausea and vomiting but that has improved since.  She was also taking off gabapentin due to the side effects.  She denies any complaint other than back pain.    Pre-op Exam    Pre-operative Risk Factors:    History of cerebrovascular disease: No    History of ischemic heart disease: No  Pre-operative treatment with insulin: No  Pre-operative creatinine >2 mg/dL: No    History of congestive heart failure: No    Review of Systems   Constitutional:  Negative for chills and fever.   HENT:  Negative for trouble swallowing.    Eyes:  Negative for visual disturbance.   Respiratory:  Negative for cough and shortness of breath.    Cardiovascular:  Negative for chest pain, palpitations and leg swelling.   Gastrointestinal:  Negative for abdominal pain, constipation and diarrhea.   Endocrine: Negative for cold intolerance and heat intolerance.   Genitourinary:  Negative for difficulty  urinating and dysuria.   Musculoskeletal:  Positive for back pain.   Skin:  Negative for rash.   Neurological:  Negative for dizziness, tremors, seizures and headaches.   Hematological:  Negative for adenopathy.   Psychiatric/Behavioral:  Negative for behavioral problems.      Past Medical History:   Diagnosis Date    Allergic     Anxiety     Asthma     GI symptoms 05/27/2020    Lactose intolerance      Past Surgical History:   Procedure Laterality Date    BACK SURGERY  2012    OVARIAN CYST REMOVAL Left 2008    UPPER GASTROINTESTINAL ENDOSCOPY  04/03/2007    Gastritis-biopsy negative for H. pylori by [facility]     Family History   Problem Relation Age of Onset    Hypertension Father     Hypertension Sister     Lung cancer Sister     Cancer Maternal Aunt     Breast cancer additional onset Cousin     Cancer Cousin      Social History     Tobacco Use    Smoking status: Never    Smokeless tobacco: Never    Tobacco comments:     no passive smoke exposure   Vaping Use    Vaping status: Never Used   Substance and Sexual Activity    Alcohol use: Never    Drug use: Never    Sexual activity: Not on file     Current Outpatient Medications on File Prior to Visit   Medication Sig    albuterol (PROVENTIL HFA,VENTOLIN HFA) 90 mcg/act inhaler albuterol sulfate HFA 90 mcg/actuation aerosol inhaler    azelastine (ASTELIN) 0.1 % nasal spray 1 spray into each nostril 2 (two) times a day Use in each nostril as directed    clotrimazole-betamethasone (LOTRISONE) 1-0.05 % cream Apply topically 2 (two) times a day    cyclobenzaprine (FLEXERIL) 10 mg tablet Take 1 tablet (10 mg total) by mouth 3 (three) times a day as needed for muscle spasms    diclofenac sodium (VOLTAREN) 50 mg EC tablet Take 50 mg by mouth 2 (two) times a day    fexofenadine (ALLEGRA) 180 MG tablet Take 1 tablet (180 mg total) by mouth daily    hydrocortisone (ANUSOL-HC) 25 mg suppository Insert 1 suppository (25 mg total) into the rectum 2 (two) times a day    omeprazole  (PriLOSEC) 40 MG capsule Take 1 capsule (40 mg total) by mouth daily    Semaglutide-Weight Management (Wegovy) 2.4 MG/0.75ML Inject 2.4 mg under the skin weekly    tobramycin (TOBREX) 0.3 % SOLN Administer 1 drop into the left eye 4 (four) times a day    [DISCONTINUED] ondansetron (ZOFRAN-ODT) 4 mg disintegrating tablet Take 1 tablet (4 mg total) by mouth every 6 (six) hours as needed for nausea or vomiting    erythromycin (ILOTYCIN) ophthalmic ointment Administer 0.5 inches into the left eye daily at bedtime (Patient not taking: Reported on 8/9/2024)    hyoscyamine (LEVSIN/SL) 0.125 mg SL tablet Take 1 tablet (0.125 mg total) by mouth 2 (two) times a day (Patient not taking: Reported on 4/14/2024)    LORazepam (ATIVAN) 0.5 mg tablet Take 1 tablet (0.5 mg total) by mouth every 8 (eight) hours as needed for anxiety (Patient not taking: Reported on 1/9/2024)    [DISCONTINUED] amoxicillin (AMOXIL) 500 mg capsule Take 500 mg by mouth every 8 (eight) hours (Patient not taking: Reported on 4/14/2024)    [DISCONTINUED] gabapentin (Neurontin) 100 mg capsule Take 1 capsule (100 mg total) by mouth 3 (three) times a day (Patient not taking: Reported on 8/9/2024)    [DISCONTINUED] gabapentin (NEURONTIN) 300 mg capsule Take 300 mg by mouth 3 (three) times a day     Allergies   Allergen Reactions    Gabapentin Anaphylaxis, Seizures and Other (See Comments)    Pregabalin Anaphylaxis, Seizures and Other (See Comments)    Acetaminophen Other (See Comments)    Codeine GI Intolerance and Other (See Comments)    Hydrocodone-Acetaminophen Other (See Comments) and Headache     Immunization History   Administered Date(s) Administered    COVID-19 PFIZER VACCINE 0.3 ML IM 04/16/2021, 05/07/2021    Td (adult), Unspecified 09/30/2012    Tdap 09/30/2012     Objective     Pulse 98   Temp 97.6 °F (36.4 °C) (Tympanic)   Resp 16   Ht 5' (1.524 m)   Wt 63.9 kg (140 lb 12.8 oz)   SpO2 99%   BMI 27.50 kg/m²     Physical Exam  Vitals and nursing  note reviewed.   Constitutional:       Appearance: She is well-developed.   HENT:      Head: Normocephalic and atraumatic.   Eyes:      Pupils: Pupils are equal, round, and reactive to light.   Cardiovascular:      Rate and Rhythm: Normal rate and regular rhythm.      Heart sounds: Normal heart sounds.   Pulmonary:      Effort: Pulmonary effort is normal.      Breath sounds: Normal breath sounds.   Abdominal:      General: Bowel sounds are normal.      Palpations: Abdomen is soft.   Musculoskeletal:      Cervical back: Normal range of motion and neck supple.   Lymphadenopathy:      Cervical: No cervical adenopathy.   Skin:     General: Skin is warm.   Neurological:      Mental Status: She is alert and oriented to person, place, and time.

## 2024-12-12 DIAGNOSIS — J01.00 ACUTE NON-RECURRENT MAXILLARY SINUSITIS: ICD-10-CM

## 2024-12-12 DIAGNOSIS — K21.9 GASTROESOPHAGEAL REFLUX DISEASE WITHOUT ESOPHAGITIS: ICD-10-CM

## 2024-12-12 RX ORDER — OMEPRAZOLE 40 MG/1
40 CAPSULE, DELAYED RELEASE ORAL DAILY
Qty: 90 CAPSULE | Refills: 1 | Status: SHIPPED | OUTPATIENT
Start: 2024-12-12

## 2024-12-12 RX ORDER — FEXOFENADINE HCL 180 MG/1
180 TABLET ORAL DAILY
Qty: 90 TABLET | Refills: 1 | Status: SHIPPED | OUTPATIENT
Start: 2024-12-12

## 2024-12-13 ENCOUNTER — TELEPHONE (OUTPATIENT)
Dept: FAMILY MEDICINE CLINIC | Facility: CLINIC | Age: 54
End: 2024-12-13

## 2024-12-13 NOTE — TELEPHONE ENCOUNTER
Patient called the RX Refill Line. Message is being forwarded to the office.     Patient is requesting if she can have a lower dose of [facility]. She only takes injection once a month and she believes this is causing her to feel sick, she is vomiting and having headaches she also ended up in the ER for this,  She would like the lower does to be sent to [facility] and would like a call once this is done ok to leave a message.    Please contact patient at [phone]

## 2024-12-17 NOTE — TELEPHONE ENCOUNTER
Patient called to check on the status of this message. Informed her that it was sent to her provider for review and that I would send another message. Patient is requesting a call back.

## 2024-12-18 NOTE — TELEPHONE ENCOUNTER
Pt is on Wegovy 2.4mg and only takes it once a month. After taking it she feels sick, vomiting and headaches. She would like a lower dose to take once a month to maintain weight.   [facility]   Please advise

## 2024-12-19 DIAGNOSIS — E66.09 CLASS 1 OBESITY DUE TO EXCESS CALORIES WITH SERIOUS COMORBIDITY AND BODY MASS INDEX (BMI) OF 31.0 TO 31.9 IN ADULT: Primary | ICD-10-CM

## 2024-12-19 DIAGNOSIS — E66.811 CLASS 1 OBESITY DUE TO EXCESS CALORIES WITH SERIOUS COMORBIDITY AND BODY MASS INDEX (BMI) OF 31.0 TO 31.9 IN ADULT: Primary | ICD-10-CM

## 2024-12-19 RX ORDER — SEMAGLUTIDE 1 MG/.5ML
INJECTION, SOLUTION SUBCUTANEOUS
Qty: 6 ML | Refills: 3 | Status: SHIPPED | OUTPATIENT
Start: 2024-12-19

## 2024-12-23 NOTE — TELEPHONE ENCOUNTER
Patient is calling again. States [facility] does not have the 1 mg of wegovy. She states they do have the 0.5 or the 1.7 mg available. If Dr would send one of those if appropriate.     Please call patient at [phone]

## 2024-12-24 DIAGNOSIS — E66.811 CLASS 1 OBESITY DUE TO EXCESS CALORIES WITH SERIOUS COMORBIDITY AND BODY MASS INDEX (BMI) OF 31.0 TO 31.9 IN ADULT: Primary | ICD-10-CM

## 2024-12-24 DIAGNOSIS — E66.09 CLASS 1 OBESITY DUE TO EXCESS CALORIES WITH SERIOUS COMORBIDITY AND BODY MASS INDEX (BMI) OF 31.0 TO 31.9 IN ADULT: Primary | ICD-10-CM

## 2024-12-24 RX ORDER — SEMAGLUTIDE 1.7 MG/.75ML
INJECTION, SOLUTION SUBCUTANEOUS
Qty: 9 ML | Refills: 0 | Status: SHIPPED | OUTPATIENT
Start: 2024-12-24

## 2024-12-24 NOTE — TELEPHONE ENCOUNTER
Dr Humphries sent the wegovy 1 mg but [facility] does not have it. She is requesting a repeat dose of 0.5 mg or higher dose at 1.7 mg.   Please advise

## 2025-01-02 ENCOUNTER — OFFICE VISIT (OUTPATIENT)
Dept: URGENT CARE | Age: 55
End: 2025-01-02
Payer: COMMERCIAL

## 2025-01-02 VITALS
SYSTOLIC BLOOD PRESSURE: 118 MMHG | HEART RATE: 87 BPM | TEMPERATURE: 97.9 F | OXYGEN SATURATION: 98 % | DIASTOLIC BLOOD PRESSURE: 57 MMHG | RESPIRATION RATE: 18 BRPM

## 2025-01-02 DIAGNOSIS — S61.451A DOG BITE OF RIGHT HAND, INITIAL ENCOUNTER: Primary | ICD-10-CM

## 2025-01-02 DIAGNOSIS — Z23 ENCOUNTER FOR IMMUNIZATION: ICD-10-CM
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
## 2025-01-02 DIAGNOSIS — Z00.6 ENCOUNTER FOR EXAMINATION FOR NORMAL COMPARISON OR CONTROL IN CLINICAL RESEARCH PROGRAM: ICD-10-CM

## 2025-01-02 DIAGNOSIS — S61.452A DOG BITE OF LEFT HAND, INITIAL ENCOUNTER: ICD-10-CM
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
## 2025-01-02 PROCEDURE — S9083 URGENT CARE CENTER GLOBAL: HCPCS | Performed by: EMERGENCY MEDICINE

## 2025-01-02 PROCEDURE — 90471 IMMUNIZATION ADMIN: CPT

## 2025-01-02 PROCEDURE — 90715 TDAP VACCINE 7 YRS/> IM: CPT

## 2025-01-02 PROCEDURE — G0382 LEV 3 HOSP TYPE B ED VISIT: HCPCS | Performed by: EMERGENCY MEDICINE

## 2025-01-02 NOTE — PROGRESS NOTES
[facility]        NAME: Kavitha Marx is a 54 y.o. female  : 1970    MRN: [id number]  DATE: 2025  TIME: 7:28 PM    Assessment and Plan   Dog bite of right hand, initial encounter [S61.451A, W54.0XXA]  1. Dog bite of right hand, initial encounter  amoxicillin-clavulanate (AUGMENTIN) 875-125 mg per tablet      2. Dog bite of left hand, initial encounter  amoxicillin-clavulanate (AUGMENTIN) 875-125 mg per tablet      3. Encounter for immunization  Tdap Vaccine greater than or equal to 6yo        Patient provided Tdap in clinic today.    Patient Instructions     Start antibiotics as prescribed  Keep area clean and dry  Watch for signs of further infection as discussed  Follow up with PCP in 3-5 days.  Proceed to  ER if symptoms worsen    Eat yogurt with live and active cultures and/or take a probiotic at least 3 hours before or after antibiotic dose. Monitor stool for diarrhea and/or blood. If this occurs, contact primary care doctor ASAP.    If tests are performed, our office will contact you with results only if changes need to made to the care plan discussed with you at the visit. [facility] on [facility]    Chief Complaint     Chief Complaint   Patient presents with    Dog Bite     Onset 1/2 PT states her dog bite her on both hands.          History of Present Illness       Patient is a 54-year-old female with no significant PMH presenting in the clinic today for dog bite.  Patient states she was bringing her dog to that vet earlier today for her dogs paw pain.  Patient states she went to lift her dog out of the car when the dogs Denis got caught on her shirt causing the dog to cry in pain and bite the patient's right and left hand.  Patient notes small superficial dog bite wound to the dorsal proximal aspect of the right thumb and the left fifth finger.  Patient notes she cleansed the area with warm soapy water.  Denies decreased ROM, numbness, tingling,  erythema, swelling, warmth, purulent drainage, bruising, fever, chills, chest pain, and SOB.  Denies use of OTC treatment for symptom management.  Patient states she is not concerned for rabies at this time as her dog is vaccinated.  Last Tdap done in 2012.        Review of Systems   Review of Systems   Constitutional:  Negative for chills and fever.   Respiratory:  Negative for shortness of breath.    Cardiovascular:  Negative for chest pain.   Skin:  Positive for wound.   Neurological:  Negative for numbness.         Current Medications       Current Outpatient Medications:     amoxicillin-clavulanate (AUGMENTIN) 875-125 mg per tablet, Take 1 tablet by mouth every 12 (twelve) hours for 7 days, Disp: 14 tablet, Rfl: 0    albuterol (PROVENTIL HFA,VENTOLIN HFA) 90 mcg/act inhaler, albuterol sulfate HFA 90 mcg/actuation aerosol inhaler, Disp: , Rfl:     azelastine (ASTELIN) 0.1 % nasal spray, 1 spray into each nostril 2 (two) times a day Use in each nostril as directed, Disp: 90 mL, Rfl: 3    clotrimazole-betamethasone (LOTRISONE) 1-0.05 % cream, Apply topically 2 (two) times a day, Disp: 45 g, Rfl: 1    cyclobenzaprine (FLEXERIL) 10 mg tablet, Take 1 tablet (10 mg total) by mouth 3 (three) times a day as needed for muscle spasms, Disp: 30 tablet, Rfl: 0    diclofenac sodium (VOLTAREN) 50 mg EC tablet, Take 50 mg by mouth 2 (two) times a day, Disp: , Rfl:     erythromycin (ILOTYCIN) ophthalmic ointment, Administer 0.5 inches into the left eye daily at bedtime (Patient not taking: Reported on 8/9/2024), Disp: 3.5 g, Rfl: 0    fexofenadine (ALLEGRA) 180 MG tablet, TAKE 1 TABLET DAILY, Disp: 90 tablet, Rfl: 1    hydrocortisone (ANUSOL-HC) 25 mg suppository, Insert 1 suppository (25 mg total) into the rectum 2 (two) times a day, Disp: 12 suppository, Rfl: 0    hyoscyamine (LEVSIN/SL) 0.125 mg SL tablet, Take 1 tablet (0.125 mg total) by mouth 2 (two) times a day (Patient not taking: Reported on 4/14/2024), Disp: 180  tablet, Rfl: 3    LORazepam (ATIVAN) 0.5 mg tablet, Take 1 tablet (0.5 mg total) by mouth every 8 (eight) hours as needed for anxiety (Patient not taking: Reported on 1/9/2024), Disp: 30 tablet, Rfl: 0    omeprazole (PriLOSEC) 40 MG capsule, TAKE 1 CAPSULE DAILY, Disp: 90 capsule, Rfl: 1    ondansetron (ZOFRAN-ODT) 4 mg disintegrating tablet, Take 1 tablet (4 mg total) by mouth every 6 (six) hours as needed for nausea or vomiting, Disp: 24 tablet, Rfl: 0    Semaglutide-Weight Management (Wegovy) 1 MG/0.5ML, Inject 1 mg under the skin weekly, Disp: 6 mL, Rfl: 3    Semaglutide-Weight Management (Wegovy) 1.7 MG/0.75ML, Inject 1.7 mg under the skin weekly, Disp: 9 mL, Rfl: 0    tobramycin (TOBREX) 0.3 % SOLN, Administer 1 drop into the left eye 4 (four) times a day, Disp: 5 mL, Rfl: 0    Current Allergies     Allergies as of 01/02/2025 - Reviewed 01/02/2025   Allergen Reaction Noted    Gabapentin Anaphylaxis, Seizures, and Other (See Comments) 09/12/2024    Pregabalin Anaphylaxis, Seizures, and Other (See Comments) 09/12/2024    Acetaminophen Other (See Comments) 10/02/2023    Codeine GI Intolerance and Other (See Comments) 07/14/2014    Hydrocodone-acetaminophen Other (See Comments) and Headache 11/05/2015            The following portions of the patient's history were reviewed and updated as appropriate: allergies, current medications, past family history, past medical history, past social history, past surgical history and problem list.     Past Medical History:   Diagnosis Date    Allergic     Anxiety     Asthma     GI symptoms 05/27/2020    Lactose intolerance        Past Surgical History:   Procedure Laterality Date    BACK SURGERY  2012    OVARIAN CYST REMOVAL Left 2008    UPPER GASTROINTESTINAL ENDOSCOPY  04/03/2007    Gastritis-biopsy negative for H. pylori by [facility]       Family History   Problem Relation Age of Onset    Hypertension Father     Hypertension Sister     Lung cancer Sister     Cancer Maternal Aunt      Breast cancer additional onset Cousin     Cancer Cousin          Medications have been verified.        Objective   /57   Pulse 87   Temp 97.9 °F (36.6 °C)   Resp 18   SpO2 98%        Physical Exam     Physical Exam  Vitals reviewed.   Constitutional:       General: She is not in acute distress.     Appearance: Normal appearance. She is normal weight. She is not ill-appearing.   HENT:      Head: Normocephalic.      Nose: Nose normal.      Mouth/Throat:      Mouth: Mucous membranes are moist.   Eyes:      Conjunctiva/sclera: Conjunctivae normal.   Cardiovascular:      Rate and Rhythm: Normal rate and regular rhythm.      Pulses: Normal pulses.      Heart sounds: Normal heart sounds. No murmur heard.     No friction rub. No gallop.   Pulmonary:      Effort: Pulmonary effort is normal.      Breath sounds: Normal breath sounds. No wheezing, rhonchi or rales.   Musculoskeletal:      Cervical back: Normal range of motion and neck supple.   Skin:     General: Skin is warm.      Capillary Refill: Capillary refill takes less than 2 seconds.      Findings: Wound present. No rash.      Comments: Small superficial bite wound located along the dorsal proximal aspect of the right thumb and the distal aspect of the left fifth finger.  No surrounding swelling, warmth, erythema, or purulent drainage noted.   Neurological:      Mental Status: She is alert.   Psychiatric:         Mood and Affect: Mood normal.         Behavior: Behavior normal.

## 2025-01-03 NOTE — PATIENT INSTRUCTIONS
Start antibiotics as prescribed  Keep area clean and dry  Watch for signs of further infection as discussed  Follow up with PCP in 3-5 days.  Proceed to  ER if symptoms worsen    Eat yogurt with live and active cultures and/or take a probiotic at least 3 hours before or after antibiotic dose. Monitor stool for diarrhea and/or blood. If this occurs, contact primary care doctor ASAP.

## 2025-01-10 ENCOUNTER — OFFICE VISIT (OUTPATIENT)
Dept: FAMILY MEDICINE CLINIC | Facility: CLINIC | Age: 55
End: 2025-01-10
Payer: COMMERCIAL

## 2025-01-10 VITALS
RESPIRATION RATE: 16 BRPM | OXYGEN SATURATION: 97 % | BODY MASS INDEX: 28.07 KG/M2 | HEART RATE: 85 BPM | HEIGHT: 60 IN | DIASTOLIC BLOOD PRESSURE: 60 MMHG | SYSTOLIC BLOOD PRESSURE: 106 MMHG | TEMPERATURE: 97.9 F | WEIGHT: 143 LBS

## 2025-01-10 DIAGNOSIS — L20.9 ATOPIC DERMATITIS, UNSPECIFIED TYPE: Primary | ICD-10-CM

## 2025-01-10 DIAGNOSIS — E78.49 OTHER HYPERLIPIDEMIA: ICD-10-CM

## 2025-01-10 DIAGNOSIS — R11.0 NAUSEA: ICD-10-CM

## 2025-01-10 DIAGNOSIS — D68.9 BLOOD COAGULATION DISORDER (HCC): ICD-10-CM

## 2025-01-10 DIAGNOSIS — F33.2 MDD (MAJOR DEPRESSIVE DISORDER), RECURRENT SEVERE, WITHOUT PSYCHOSIS (HCC): ICD-10-CM

## 2025-01-10 DIAGNOSIS — R56.9 SEIZURE (HCC): ICD-10-CM

## 2025-01-10 DIAGNOSIS — Z12.31 ENCOUNTER FOR SCREENING MAMMOGRAM FOR BREAST CANCER: ICD-10-CM

## 2025-01-10 DIAGNOSIS — R11.2 NAUSEA AND VOMITING, UNSPECIFIED VOMITING TYPE: ICD-10-CM

## 2025-01-10 PROBLEM — K58.1 IRRITABLE BOWEL SYNDROME WITH CONSTIPATION: Status: RESOLVED | Noted: 2024-04-09 | Resolved: 2025-01-10

## 2025-01-10 PROCEDURE — 99214 OFFICE O/P EST MOD 30 MIN: CPT | Performed by: FAMILY MEDICINE

## 2025-01-10 RX ORDER — ONDANSETRON 4 MG/1
4 TABLET, ORALLY DISINTEGRATING ORAL EVERY 6 HOURS PRN
Qty: 24 TABLET | Refills: 0 | Status: SHIPPED | OUTPATIENT
Start: 2025-01-10

## 2025-01-10 RX ORDER — MOMETASONE FUROATE 1 MG/G
CREAM TOPICAL DAILY
Qty: 45 G | Refills: 2 | Status: SHIPPED | OUTPATIENT
Start: 2025-01-10

## 2025-01-10 NOTE — ASSESSMENT & PLAN NOTE
Orders:  •  ondansetron (ZOFRAN-ODT) 4 mg disintegrating tablet; Take 1 tablet (4 mg total) by mouth every 6 (six) hours as needed for nausea or vomiting

## 2025-01-10 NOTE — PROGRESS NOTES
Name: Kavitha Marx      : 1970      MRN: [id number]  Encounter Provider: Dharmesh Humphries MD  Encounter Date: 1/10/2025   [facility]    Assessment & Plan  Atopic dermatitis, unspecified type  Patient did not want to be referred to dermatologist at this time  Orders:  •  mometasone (ELOCON) 0.1 % cream; Apply topically daily    Nausea    Orders:  •  ondansetron (ZOFRAN-ODT) 4 mg disintegrating tablet; Take 1 tablet (4 mg total) by mouth every 6 (six) hours as needed for nausea or vomiting    Seizure (HCC)  Reaction to Neurontin but resolved since then.           Other hyperlipidemia  Well controlled  Orders:  •  CBC and differential; Future  •  Comprehensive metabolic panel; Future  •  Lipid Panel with Direct LDL reflex; Future  •  TSH, 3rd generation with Free T4 reflex; Future    BMI 27.0-27.9,adult  Increase frequency of wegovy to 1.7 mg injections weekly       Nausea and vomiting, unspecified vomiting type  Decrease dose of Wegovy from 1.7 mg monthly to 1 mg monthly  Continue Zofran 4 mg tablets prn nausea       Encounter for screening mammogram for breast cancer    Orders:  •  Mammo screening bilateral w 3d and cad; Future         History of Present Illness     Kavitha is a 54 year old female with multiple medical conditions presenting for a 3 month follow up. She states that her currently she has been taking wegovy with multiple side effects. She is on the 1.7 mg injection and using it about once every month for maintenance. She states that after she takes her injections she experienced both nausea and vomiting. She states that it lasted 2 days and she has since improved. She states she took both zofran and dramamine with mild improvement. She reports that her nausea has resolved. She also reports an area of pruritic and erythematous skin on her left hand. She states she had a ringworm infection a couple months ago. She states she has been using lotrisone  which she states helps. She reports the symptoms return when she stops using the cream. She also reports she was recently bitten by her dog 1 week ago. She was prescribed augmentin which she did not take. She reports the injury has fully healed.      Review of Systems   Constitutional:  Negative for fatigue and fever.   HENT:  Negative for ear pain.    Respiratory:  Negative for shortness of breath and wheezing.    Cardiovascular:  Negative for chest pain, palpitations and leg swelling.   Gastrointestinal:  Negative for abdominal pain and constipation.   Genitourinary:  Negative for difficulty urinating and dysuria.   Neurological:  Negative for dizziness and syncope.     Past Medical History:   Diagnosis Date   • Allergic    • Anxiety    • Asthma    • GI symptoms 05/27/2020   • Lactose intolerance      Past Surgical History:   Procedure Laterality Date   • BACK SURGERY  2012   • OVARIAN CYST REMOVAL Left 2008   • UPPER GASTROINTESTINAL ENDOSCOPY  04/03/2007    Gastritis-biopsy negative for H. pylori by [facility]     Family History   Problem Relation Age of Onset   • Hypertension Father    • Hypertension Sister    • Lung cancer Sister    • Cancer Maternal Aunt    • Breast cancer additional onset Cousin    • Cancer Cousin      Social History     Tobacco Use   • Smoking status: Never   • Smokeless tobacco: Never   • Tobacco comments:     no passive smoke exposure   Vaping Use   • Vaping status: Never Used   Substance and Sexual Activity   • Alcohol use: Never   • Drug use: Never   • Sexual activity: Not on file     Current Outpatient Medications on File Prior to Visit   Medication Sig   • albuterol (PROVENTIL HFA,VENTOLIN HFA) 90 mcg/act inhaler albuterol sulfate HFA 90 mcg/actuation aerosol inhaler   • azelastine (ASTELIN) 0.1 % nasal spray 1 spray into each nostril 2 (two) times a day Use in each nostril as directed   • clotrimazole-betamethasone (LOTRISONE) 1-0.05 % cream Apply topically 2 (two) times a day   •  cyclobenzaprine (FLEXERIL) 10 mg tablet Take 1 tablet (10 mg total) by mouth 3 (three) times a day as needed for muscle spasms   • diclofenac sodium (VOLTAREN) 50 mg EC tablet Take 50 mg by mouth 2 (two) times a day   • fexofenadine (ALLEGRA) 180 MG tablet TAKE 1 TABLET DAILY   • omeprazole (PriLOSEC) 40 MG capsule TAKE 1 CAPSULE DAILY   • Semaglutide-Weight Management (Wegovy) 1.7 MG/0.75ML Inject 1.7 mg under the skin weekly   • [DISCONTINUED] ondansetron (ZOFRAN-ODT) 4 mg disintegrating tablet Take 1 tablet (4 mg total) by mouth every 6 (six) hours as needed for nausea or vomiting   • [] amoxicillin-clavulanate (AUGMENTIN) 875-125 mg per tablet Take 1 tablet by mouth every 12 (twelve) hours for 7 days   • hyoscyamine (LEVSIN/SL) 0.125 mg SL tablet Take 1 tablet (0.125 mg total) by mouth 2 (two) times a day (Patient not taking: Reported on 2024)   • LORazepam (ATIVAN) 0.5 mg tablet Take 1 tablet (0.5 mg total) by mouth every 8 (eight) hours as needed for anxiety (Patient not taking: Reported on 2024)   • [DISCONTINUED] erythromycin (ILOTYCIN) ophthalmic ointment Administer 0.5 inches into the left eye daily at bedtime   • [DISCONTINUED] hydrocortisone (ANUSOL-HC) 25 mg suppository Insert 1 suppository (25 mg total) into the rectum 2 (two) times a day   • [DISCONTINUED] Semaglutide-Weight Management (Wegovy) 1 MG/0.5ML Inject 1 mg under the skin weekly   • [DISCONTINUED] tobramycin (TOBREX) 0.3 % SOLN Administer 1 drop into the left eye 4 (four) times a day     Allergies   Allergen Reactions   • Gabapentin Anaphylaxis, Seizures and Other (See Comments)   • Pregabalin Anaphylaxis, Seizures and Other (See Comments)   • Acetaminophen Other (See Comments)   • Codeine GI Intolerance and Other (See Comments)   • Hydrocodone-Acetaminophen Other (See Comments) and Headache     Immunization History   Administered Date(s) Administered   • COVID-19 PFIZER VACCINE 0.3 ML IM 2021, 2021   • Td  (adult), Unspecified 09/30/2012   • Tdap 09/30/2012, 01/02/2025     Objective   /60 (BP Location: Left arm, Patient Position: Sitting, Cuff Size: Adult)   Pulse 85   Temp 97.9 °F (36.6 °C) (Tympanic)   Resp 16   Ht 5' (1.524 m)   Wt 64.9 kg (143 lb)   SpO2 97%   BMI 27.93 kg/m²     Physical Exam  Vitals and nursing note reviewed.   Constitutional:       General: She is not in acute distress.     Appearance: Normal appearance.   HENT:      Right Ear: Tympanic membrane, ear canal and external ear normal.      Left Ear: Tympanic membrane, ear canal and external ear normal.      Mouth/Throat:      Mouth: Mucous membranes are moist.      Pharynx: No oropharyngeal exudate or posterior oropharyngeal erythema.   Cardiovascular:      Rate and Rhythm: Normal rate and regular rhythm.      Heart sounds: No murmur heard.     No friction rub. No gallop.   Pulmonary:      Effort: Pulmonary effort is normal. No respiratory distress.      Breath sounds: No wheezing, rhonchi or rales.   Skin:     General: Skin is warm and dry.      Comments: Patient has small erythematous patch of skin located on the posterior aspect of the left hand   Neurological:      General: No focal deficit present.      Mental Status: She is alert and oriented to person, place, and time.   Psychiatric:         Mood and Affect: Mood normal.         Behavior: Behavior normal.

## 2025-01-10 NOTE — ASSESSMENT & PLAN NOTE
Patient did not want to be referred to dermatologist at this time  Orders:  •  mometasone (ELOCON) 0.1 % cream; Apply topically daily

## 2025-01-10 NOTE — ASSESSMENT & PLAN NOTE
Well controlled  Orders:  •  CBC and differential; Future  •  Comprehensive metabolic panel; Future  •  Lipid Panel with Direct LDL reflex; Future  •  TSH, 3rd generation with Free T4 reflex; Future

## 2025-02-17 ENCOUNTER — TELEPHONE (OUTPATIENT)
Age: 55
End: 2025-02-17

## 2025-02-17 NOTE — TELEPHONE ENCOUNTER
Pt was last seen 1/10/25 and is currently on the wegovy 1.7mg. she would like to go back to the 0.5 dose because of the nausea she is having.  Please advise

## 2025-02-17 NOTE — TELEPHONE ENCOUNTER
"Patient calling in with request for adjustment for maintenance dose of Wegovy    Patient has completed full process and is now on  maintenance dose and was prescribed 1.7 mL but is afraid to take higher doses as she \"gets sick\"    She is requesting a new prescription for 0.5mL be sent to her pharmacy.     Please review.      "

## 2025-02-19 NOTE — TELEPHONE ENCOUNTER
I sent provider a refill on the wegovy for 1 month supply because the dose will usually change after each month up until the maintenance dose.

## 2025-02-19 NOTE — TELEPHONE ENCOUNTER
Received call from patient   States Wegovy prescription was sent to [facility] for 1 month supply  States they will only refill prescription for 3 month supply     She is asking if provider would like to keep 1 month supply and have her follow up with apt? Or would he like to send [facility] 3 month supply and follow up at her apt scheduled in April?    Please advise  If he will only approve 1 month supply please resend prescription to [facility] in [place]

## 2025-02-19 NOTE — TELEPHONE ENCOUNTER
Pt would like the wegovy refill to be sent to [place] [facility] as the [facility] will only do 3 month supply. Refill sent to provider for approval

## 2025-02-21 NOTE — TELEPHONE ENCOUNTER
Reason for call:   [x] Refill   [] Prior Auth  [x] Other: previous pharmacy dis not have med in stock    Office:   [x] PCP/Provider -  Dharmesh Humphries MD   [] Specialty/Provider -     Medication: WEGOVY) 0.5 MG/     Dose/Frequency: 0.5 mg once a week    Quantity: 2 ml    Pharmacy: [facility]/pharmacy #[id number] [address]     Does the patient have enough for 3 days?   [] Yes   [x] No - Send as HP to POD

## 2025-02-25 NOTE — TELEPHONE ENCOUNTER
PA for WEGOVY 0.5MG/0.5ML APPROVED     Date(s) approved 02/25/2025-02/24/2026    Case #[id number]     Patient advised by          [x]MyChart Message  []Phone call   []LMOM  []L/M to call office as no active Communication consent on file  [x]Unable to leave detailed message as VM not approved on Communication consent       Pharmacy advised by    [x]Fax  []Phone call     Approval letter scanned into Media Yes

## 2025-02-25 NOTE — TELEPHONE ENCOUNTER
PA for WEGOVY 0.5MG/0.5ML SUBMITTED to [employer]    via    []CMM-KEY:   []Surescripts-Case ID #   []Availity-Auth ID # NDC #   []Faxed to plan   [x]Other website-PromptPA: [id number]  []Phone call Case ID #     [x]PA sent as URGENT    All office notes, labs and other pertaining documents and studies sent. Clinical questions answered. Awaiting determination from insurance company.     Turnaround time for your insurance to make a decision on your Prior Authorization can take 7-21 business days.

## 2025-03-12 DIAGNOSIS — K58.1 IRRITABLE BOWEL SYNDROME WITH CONSTIPATION: ICD-10-CM

## 2025-03-12 DIAGNOSIS — J30.89 NON-SEASONAL ALLERGIC RHINITIS DUE TO OTHER ALLERGIC TRIGGER: ICD-10-CM

## 2025-03-13 RX ORDER — HYOSCYAMINE SULFATE 0.12 MG/1
TABLET SUBLINGUAL
Qty: 180 TABLET | Refills: 3 | Status: SHIPPED | OUTPATIENT
Start: 2025-03-13

## 2025-03-13 RX ORDER — AZELASTINE HYDROCHLORIDE 137 UG/1
1 SPRAY, METERED NASAL 2 TIMES DAILY
Qty: 60 ML | Refills: 0 | Status: SHIPPED | OUTPATIENT
Start: 2025-03-13

## 2025-03-19 NOTE — TELEPHONE ENCOUNTER
Per [employer] , wegovy approved valid until 9/7/24.l/m informing pt. [place] , pt already received medication. No

## 2025-03-20 ENCOUNTER — OFFICE VISIT (OUTPATIENT)
Dept: URGENT CARE | Age: 55
End: 2025-03-20
Payer: COMMERCIAL

## 2025-03-20 VITALS
RESPIRATION RATE: 18 BRPM | DIASTOLIC BLOOD PRESSURE: 62 MMHG | SYSTOLIC BLOOD PRESSURE: 104 MMHG | TEMPERATURE: 97.4 F | HEART RATE: 77 BPM | OXYGEN SATURATION: 98 %

## 2025-03-20 DIAGNOSIS — M25.532 LEFT WRIST PAIN: Primary | ICD-10-CM

## 2025-03-20 PROCEDURE — S9083 URGENT CARE CENTER GLOBAL: HCPCS

## 2025-03-20 PROCEDURE — G0382 LEV 3 HOSP TYPE B ED VISIT: HCPCS

## 2025-03-20 RX ORDER — PREDNISONE 10 MG/1
TABLET ORAL
Qty: 21 TABLET | Refills: 0 | Status: SHIPPED | OUTPATIENT
Start: 2025-03-20 | End: 2025-03-26

## 2025-03-20 NOTE — PATIENT INSTRUCTIONS
Take steroids as directed. Recommend to take them in the morning and with food.   Use wrist brace as needed for comfort; take off every hour during the day to perform gentle stretches of the hand. Take off to sleep and shower.   Acetaminophen for pain.  Heat or ice as needed.  Follow-up with orthopedics.  PCP follow-up in 2-3 days.  Proceed to the ER if symptoms worsen.

## 2025-03-20 NOTE — PROGRESS NOTES
[facility]        NAME: Kavitha Marx is a 54 y.o. female  : 1970    MRN: [id number]  DATE: 2025  TIME: 7:54 PM      Assessment and Plan     Left wrist pain [M25.532]  1. Left wrist pain  Ambulatory Referral to Orthopedic Surgery    predniSONE 10 mg tablet    Splint        Patient agreeable to hold on x-ray at time no ecchymosis. Patient aware to check with weight management prior to taking oral prednisone. Aware to f/u with orthopedics.     Patient Instructions     Take steroids as directed. Recommend to take them in the morning and with food.   Use wrist brace as needed for comfort; take off every hour during the day to perform gentle stretches of the hand. Take off to sleep and shower.   Acetaminophen for pain.  Heat or ice as needed.  Follow-up with orthopedics.  PCP follow-up in 2-3 days.  Proceed to the ER if symptoms worsen.     Chief Complaint     Chief Complaint   Patient presents with    Hand Pain     C/o left hand pain starting 4 days ago, she reports that after she went shopping with daughter and was holding heavy bag the pain started and has gotten worse.          History of Present Illness     Patient is a 54-year-old female who presents with left hand and wrist pain. States she has been having pain for some time but it was worse after carrying heavy bags. Denies bruising. Denies known injury or trauma. Reports it might be swelling (noticed indentation from rings). Denies numbness or tingling. Reports pain with gripping. Reports she is right hand dominant.     Hand Pain   Pertinent negatives include no numbness.       Review of Systems     Review of Systems   Musculoskeletal:  Positive for arthralgias and joint swelling.   Skin:  Negative for color change and wound.   Neurological:  Negative for numbness.   All other systems reviewed and are negative.        Current Medications       Current Outpatient Medications:     hyoscyamine (LEVSIN/SL) 0.125 mg SL tablet, USE 1 TABLET  TWICE A DAY, Disp: 180 tablet, Rfl: 3    predniSONE 10 mg tablet, Take 6 tablets (60 mg total) by mouth daily for 1 day, THEN 5 tablets (50 mg total) daily for 1 day, THEN 4 tablets (40 mg total) daily for 1 day, THEN 3 tablets (30 mg total) daily for 1 day, THEN 2 tablets (20 mg total) daily for 1 day, THEN 1 tablet (10 mg total) daily for 1 day., Disp: 21 tablet, Rfl: 0    albuterol (PROVENTIL HFA,VENTOLIN HFA) 90 mcg/act inhaler, albuterol sulfate HFA 90 mcg/actuation aerosol inhaler, Disp: , Rfl:     Azelastine HCl 137 MCG/SPRAY SOLN, USE 1 SPRAY IN EACH NOSTRIL TWICE A DAY AS DIRECTED, Disp: 60 mL, Rfl: 0    clotrimazole-betamethasone (LOTRISONE) 1-0.05 % cream, Apply topically 2 (two) times a day, Disp: 45 g, Rfl: 1    cyclobenzaprine (FLEXERIL) 10 mg tablet, Take 1 tablet (10 mg total) by mouth 3 (three) times a day as needed for muscle spasms, Disp: 30 tablet, Rfl: 0    diclofenac sodium (VOLTAREN) 50 mg EC tablet, Take 50 mg by mouth 2 (two) times a day, Disp: , Rfl:     fexofenadine (ALLEGRA) 180 MG tablet, TAKE 1 TABLET DAILY, Disp: 90 tablet, Rfl: 1    LORazepam (ATIVAN) 0.5 mg tablet, Take 1 tablet (0.5 mg total) by mouth every 8 (eight) hours as needed for anxiety (Patient not taking: Reported on 1/9/2024), Disp: 30 tablet, Rfl: 0    mometasone (ELOCON) 0.1 % cream, Apply topically daily, Disp: 45 g, Rfl: 2    omeprazole (PriLOSEC) 40 MG capsule, TAKE 1 CAPSULE DAILY, Disp: 90 capsule, Rfl: 1    ondansetron (ZOFRAN-ODT) 4 mg disintegrating tablet, Take 1 tablet (4 mg total) by mouth every 6 (six) hours as needed for nausea or vomiting, Disp: 24 tablet, Rfl: 0    Semaglutide-Weight Management (WEGOVY) 0.5 MG/0.5ML, Inject 0.5 mL (0.5 mg total) under the skin once a week for 28 days, Disp: 2 mL, Rfl: 0    Current Allergies     Allergies as of 03/20/2025 - Reviewed 03/20/2025   Allergen Reaction Noted    Gabapentin Anaphylaxis, Seizures, and Other (See Comments) 09/12/2024    Pregabalin Anaphylaxis,  Seizures, and Other (See Comments) 09/12/2024    Acetaminophen Other (See Comments) 10/02/2023    Codeine GI Intolerance and Other (See Comments) 07/14/2014    Hydrocodone-acetaminophen Other (See Comments) and Headache 11/05/2015              The following portions of the patient's history were reviewed and updated as appropriate: allergies, current medications, past family history, past medical history, past social history, past surgical history and problem list.     Past Medical History:   Diagnosis Date    Allergic     Anxiety     Asthma     GI symptoms 05/27/2020    Lactose intolerance        Past Surgical History:   Procedure Laterality Date    BACK SURGERY  2012    OVARIAN CYST REMOVAL Left 2008    UPPER GASTROINTESTINAL ENDOSCOPY  04/03/2007    Gastritis-biopsy negative for H. pylori by [facility]       Family History   Problem Relation Age of Onset    Hypertension Father     Hypertension Sister     Lung cancer Sister     Cancer Maternal Aunt     Breast cancer additional onset Cousin     Cancer Cousin          Medications have been verified.        Objective     /62   Pulse 77   Temp (!) 97.4 °F (36.3 °C) (Tympanic)   Resp 18   SpO2 98%   No LMP recorded.         Physical Exam     Physical Exam  Vitals and nursing note reviewed.   Constitutional:       General: She is not in acute distress.     Appearance: Normal appearance. She is not ill-appearing, toxic-appearing or diaphoretic.   Musculoskeletal:      Left wrist: Tenderness present. No swelling or bony tenderness. Normal range of motion. Normal pulse.      Left hand: Swelling (trace dorsal wrist) and tenderness present. No bony tenderness. Normal range of motion. Normal pulse.      Comments: Positive finkelstein.   No ecchymosis to left hand. Tenderness to palpation left thumb.    Skin:     Capillary Refill: Capillary refill takes less than 2 seconds.   Neurological:      Mental Status: She is alert.   Psychiatric:         Mood and Affect: Mood  normal.         Behavior: Behavior normal.         Thought Content: Thought content normal.         Judgment: Judgment normal.   Orthopedic injury treatment    Date/Time: 3/20/2025 7:30 PM    Performed by: JUANITA Jessica  Authorized by: JUANITA Jessica    Patient Location:  [facility] Protocol:  procedure performed by consultantConsent: Verbal consent obtained.  Risks and benefits: risks, benefits and alternatives were discussed  Consent given by: patient  Patient understanding: patient states understanding of the procedure being performed    Injury location:  Wrist  Location details:  Left wrist  Injury type: tendonitiis.  Neurovascular status: Neurovascularly intact    Distal perfusion: normal    Neurological function: normal    Range of motion: normal    Immobilization:  Splint (quick fit left wrist w.t.o)  Neurovascular status: Neurovascularly intact    Distal perfusion: normal    Neurological function: normal    Range of motion: normal    Patient tolerance:  Patient tolerated the procedure well with no immediate complications   Reports left wrist and hand pain improved post brace application

## 2025-03-25 DIAGNOSIS — R11.0 NAUSEA: ICD-10-CM

## 2025-03-27 RX ORDER — ONDANSETRON 4 MG/1
4 TABLET, ORALLY DISINTEGRATING ORAL EVERY 6 HOURS PRN
Qty: 24 TABLET | Refills: 0 | Status: SHIPPED | OUTPATIENT
Start: 2025-03-27

## 2025-04-11 ENCOUNTER — OFFICE VISIT (OUTPATIENT)
Dept: FAMILY MEDICINE CLINIC | Facility: CLINIC | Age: 55
End: 2025-04-11
Payer: COMMERCIAL

## 2025-04-11 VITALS
RESPIRATION RATE: 16 BRPM | HEIGHT: 60 IN | HEART RATE: 85 BPM | WEIGHT: 151 LBS | DIASTOLIC BLOOD PRESSURE: 72 MMHG | BODY MASS INDEX: 29.64 KG/M2 | TEMPERATURE: 97.8 F | SYSTOLIC BLOOD PRESSURE: 122 MMHG | OXYGEN SATURATION: 97 %

## 2025-04-11 DIAGNOSIS — R11.0 NAUSEA: Primary | ICD-10-CM

## 2025-04-11 DIAGNOSIS — E78.49 OTHER HYPERLIPIDEMIA: ICD-10-CM

## 2025-04-11 DIAGNOSIS — Z12.31 ENCOUNTER FOR SCREENING MAMMOGRAM FOR BREAST CANCER: ICD-10-CM

## 2025-04-11 DIAGNOSIS — Z00.00 HEALTHCARE MAINTENANCE: ICD-10-CM

## 2025-04-11 PROCEDURE — 99214 OFFICE O/P EST MOD 30 MIN: CPT | Performed by: FAMILY MEDICINE

## 2025-04-11 PROCEDURE — 99396 PREV VISIT EST AGE 40-64: CPT | Performed by: FAMILY MEDICINE

## 2025-04-11 RX ORDER — ONDANSETRON 4 MG/1
4 TABLET, ORALLY DISINTEGRATING ORAL EVERY 6 HOURS PRN
Qty: 24 TABLET | Refills: 0 | Status: SHIPPED | OUTPATIENT
Start: 2025-04-11

## 2025-04-11 NOTE — ASSESSMENT & PLAN NOTE
Currently well-controlled on no medications.   Continue the same.   Order placed for fasting lipid profile.  Orders:  •  CBC and differential; Future  •  Comprehensive metabolic panel; Future  •  Lipid Panel with Direct LDL reflex; Future  •  TSH, 3rd generation with Free T4 reflex; Future

## 2025-04-11 NOTE — ASSESSMENT & PLAN NOTE
Currently on Wegovy  0.5mg injection.  Increasing to 1 mg injection.   Discussed healthy diet and regular exercise.   Will continue to monitor   Follow-up in 3 months   Orders:  •  Semaglutide-Weight Management (WEGOVY) 1 MG/0.5ML; Inject 0.5 mL (1 mg total) under the skin once a week

## 2025-04-11 NOTE — PROGRESS NOTES
Name: Kavitha Marx      : 1970      MRN: [id number]  Encounter Provider: Dharmesh Humphries MD  Encounter Date: 2025   [facility]  :  Assessment & Plan  Nausea  Improved since decreasing Wegovy to .5 mg injection and using Zofran PRN.   Order placed for 1 mg weekly injection.   Discussed proper use and potential side effects.   Will continue to monitor.   Orders:  •  ondansetron (ZOFRAN-ODT) 4 mg disintegrating tablet; Take 1 tablet (4 mg total) by mouth every 6 (six) hours as needed for nausea or vomiting    BMI 29.0-29.9,adult  Currently on Wegovy  0.5mg injection.  Increasing to 1 mg injection.   Discussed healthy diet and regular exercise.   Will continue to monitor   Follow-up in 3 months   Orders:  •  Semaglutide-Weight Management (WEGOVY) 1 MG/0.5ML; Inject 0.5 mL (1 mg total) under the skin once a week    Other hyperlipidemia  Currently well-controlled on no medications.   Continue the same.   Order placed for fasting lipid profile.  Orders:  •  CBC and differential; Future  •  Comprehensive metabolic panel; Future  •  Lipid Panel with Direct LDL reflex; Future  •  TSH, 3rd generation with Free T4 reflex; Future    Encounter for screening mammogram for breast cancer    Orders:  •  Mammo screening bilateral w 3d and cad; Future    Healthcare maintenance  It was discussed about immunizations, diet, exercise and safety measures.  Orders:  •  Hemoglobin A1C; Future           History of Present Illness   Pt is a 55 y/o female here today for 3 month follow-up.  She was not using her Wegovy and she gained some weight.  She started back on 0.5 mg weekly and since then she lost 5 pounds.  She reports she is doing well overall. No active complaints at this time. She reports taking all medications as directed and denies any side effects. Blood pressure in the office today is 122/72. No recent labs to review.         Review of Systems   Constitutional:   Negative for chills and fever.   HENT:  Negative for ear pain and sore throat.    Eyes:  Negative for pain and visual disturbance.   Respiratory:  Negative for cough and shortness of breath.    Cardiovascular:  Negative for chest pain and palpitations.   Gastrointestinal:  Negative for abdominal pain and vomiting.   Genitourinary:  Negative for dysuria and hematuria.   Musculoskeletal:  Negative for arthralgias and back pain.   Skin:  Negative for color change and rash.   Neurological:  Negative for seizures and syncope.   All other systems reviewed and are negative.      Objective   /72 (BP Location: Left arm, Patient Position: Sitting, Cuff Size: Large)   Pulse 85   Temp 97.8 °F (36.6 °C) (Tympanic)   Resp 16   Ht 5' (1.524 m)   Wt 68.5 kg (151 lb)   SpO2 97%   BMI 29.49 kg/m²      Physical Exam  Vitals and nursing note reviewed.   Constitutional:       General: She is not in acute distress.     Appearance: She is well-developed.   HENT:      Head: Normocephalic and atraumatic.   Eyes:      Conjunctiva/sclera: Conjunctivae normal.   Cardiovascular:      Rate and Rhythm: Normal rate and regular rhythm.      Heart sounds: No murmur heard.  Pulmonary:      Effort: Pulmonary effort is normal. No respiratory distress.      Breath sounds: Normal breath sounds.   Abdominal:      Palpations: Abdomen is soft.      Tenderness: There is no abdominal tenderness.   Musculoskeletal:         General: No swelling.      Cervical back: Neck supple.   Skin:     General: Skin is warm and dry.      Capillary Refill: Capillary refill takes less than 2 seconds.   Neurological:      Mental Status: She is alert.   Psychiatric:         Mood and Affect: Mood normal.

## 2025-04-11 NOTE — ASSESSMENT & PLAN NOTE
Improved since decreasing Wegovy to .5 mg injection and using Zofran PRN.   Order placed for 1 mg weekly injection.   Discussed proper use and potential side effects.   Will continue to monitor.   Orders:  •  ondansetron (ZOFRAN-ODT) 4 mg disintegrating tablet; Take 1 tablet (4 mg total) by mouth every 6 (six) hours as needed for nausea or vomiting

## 2025-04-11 NOTE — ASSESSMENT & PLAN NOTE
Currently on Wegovy  0.5mg injection.  Increasing to 1 mg injection.   Discussed healthy diet and regular exercise.   Will continue to monitor   Follow-up in 3 months         Procedure   Large Joint Arthrocentesis: R knee  Date/Time: 6/26/2019 8:55 AM  Consent given by: patient  Site marked: site marked  Timeout: Immediately prior to procedure a time out was called to verify the correct patient, procedure, equipment, support staff and site/side marked as required   Supporting Documentation  Indications: pain   Procedure Details  Location: knee - R knee  Preparation: Patient was prepped and draped in the usual sterile fashion  Needle size: 22 G  Approach: anterolateral  Medications administered: 30 mg Hyaluronan 30 MG/2ML; 3 mL lidocaine PF 1% 1 %      Large Joint Arthrocentesis: L knee  Date/Time: 6/26/2019 8:56 AM  Consent given by: patient  Site marked: site marked  Timeout: Immediately prior to procedure a time out was called to verify the correct patient, procedure, equipment, support staff and site/side marked as required   Supporting Documentation  Indications: pain   Procedure Details  Location: knee - L knee  Preparation: Patient was prepped and draped in the usual sterile fashion  Needle size: 22 G  Approach: anterolateral  Medications administered: 30 mg Hyaluronan 30 MG/2ML; 3 mL lidocaine PF 1% 1 %  Patient tolerance: patient tolerated the procedure well with no immediate complications

## 2025-04-29 ENCOUNTER — VBI (OUTPATIENT)
Dept: ADMINISTRATIVE | Facility: OTHER | Age: 55
End: 2025-04-29

## 2025-04-29 NOTE — TELEPHONE ENCOUNTER
04/29/25 7:50 AM     Chart reviewed for   Cervical Cancer Screening    ; nothing is submitted to the patient's insurance at this time.     ROOSEVELT MCKINLEY MA   [facility]

## 2025-05-11 PROBLEM — Z00.00 HEALTHCARE MAINTENANCE: Status: RESOLVED | Noted: 2025-04-11 | Resolved: 2025-05-11

## 2025-06-03 ENCOUNTER — PATIENT MESSAGE (OUTPATIENT)
Dept: FAMILY MEDICINE CLINIC | Facility: CLINIC | Age: 55
End: 2025-06-03

## 2025-06-03 NOTE — PATIENT COMMUNICATION
Pt would like to try zepbound. She was seen on 4/11/25 at which time she was on wegovy but stopped it. She is scheduled for a follow up on 7/11/25.  Please advise

## 2025-06-05 NOTE — PATIENT COMMUNICATION
Patient calling to check for update on request for Zepbound. Informed patient that her message did get sent to Primary Care Provider and he will review it as soon as he can and patient will be notified.

## 2025-06-10 DIAGNOSIS — J30.89 NON-SEASONAL ALLERGIC RHINITIS DUE TO OTHER ALLERGIC TRIGGER: ICD-10-CM

## 2025-06-10 DIAGNOSIS — K21.9 GASTROESOPHAGEAL REFLUX DISEASE WITHOUT ESOPHAGITIS: ICD-10-CM

## 2025-06-10 DIAGNOSIS — J01.00 ACUTE NON-RECURRENT MAXILLARY SINUSITIS: ICD-10-CM

## 2025-06-10 NOTE — TELEPHONE ENCOUNTER
See two previous messages patient asking to try Zepbound. Was on wegovy in the past. Previous messages sent to you

## 2025-06-10 NOTE — PATIENT COMMUNICATION
Patient called in inquiring about request for zepbound upon review warm transfer to [facility] patient will receive a call back once Primary Care Provider review note

## 2025-06-12 RX ORDER — OMEPRAZOLE 40 MG/1
40 CAPSULE, DELAYED RELEASE ORAL DAILY
Qty: 90 CAPSULE | Refills: 1 | Status: SHIPPED | OUTPATIENT
Start: 2025-06-12

## 2025-06-12 RX ORDER — FEXOFENADINE HCL 180 MG/1
180 TABLET ORAL DAILY
Qty: 90 TABLET | Refills: 1 | Status: SHIPPED | OUTPATIENT
Start: 2025-06-12

## 2025-06-12 RX ORDER — AZELASTINE HYDROCHLORIDE 137 UG/1
1 SPRAY, METERED NASAL 2 TIMES DAILY
Qty: 60 ML | Refills: 3 | Status: SHIPPED | OUTPATIENT
Start: 2025-06-12

## 2025-06-12 RX ORDER — TIRZEPATIDE 2.5 MG/.5ML
2.5 INJECTION, SOLUTION SUBCUTANEOUS WEEKLY
Qty: 2 ML | Refills: 0 | Status: SHIPPED | OUTPATIENT
Start: 2025-06-12 | End: 2025-06-13 | Stop reason: SDUPTHER

## 2025-06-12 NOTE — PATIENT COMMUNICATION
Pt last seen 4/11/25  Upcoming appt is scheduled for 7/11/25     Pt has called office a few times requesting Zepbound. She is not taking Wegovy due to being extremely tired and weight stayed the same for three weeks and did not lose any weight after the first week.     Please advise

## 2025-06-12 NOTE — PATIENT COMMUNICATION
I sent her Zepbound 2.5mg weekly but it will have to go through another prior auth and unsure if it will be covered by her insurance.

## 2025-06-12 NOTE — PATIENT COMMUNICATION
Pt called and is upset that it's been a week and she still has not recd a response about starting the Zepbound medication. I attempted to warm transfer but no one answered.    Can patient be contacted to ph # on file    Please advise

## 2025-06-12 NOTE — TELEPHONE ENCOUNTER
Reason for call:   [x] Refill- [facility] since [facility] is not a 90 day supply. Requesting script be transferred to [facility]  [] Prior Auth  [] Other:     Office:   [x] PCP/Provider - [facility]  Authorized By: Antonia Carter PA-C  [] Specialty/Provider -     Medication: tirzepatide (Zepbound) 2.5 mg/0.5 mL auto-injector     Dose/Frequency: Inject 0.5 mL (2.5 mg total) under the skin once a week for 28 days     Quantity: 2mL    Pharmacy: [facility]/pharmacy #[id number] [facility], [address]     Local Pharmacy   Does the patient have enough for 3 days?   [] Yes   [] No - Send as HP to POD    Mail Away Pharmacy   Does the patient have enough for 10 days?   [] Yes   [] No - Send as HP to POD

## 2025-06-13 ENCOUNTER — TELEPHONE (OUTPATIENT)
Dept: FAMILY MEDICINE CLINIC | Facility: CLINIC | Age: 55
End: 2025-06-13

## 2025-06-13 RX ORDER — TIRZEPATIDE 2.5 MG/.5ML
2.5 INJECTION, SOLUTION SUBCUTANEOUS WEEKLY
Qty: 2 ML | Refills: 0 | OUTPATIENT
Start: 2025-06-13 | End: 2025-07-11

## 2025-06-13 RX ORDER — TIRZEPATIDE 2.5 MG/.5ML
2.5 INJECTION, SOLUTION SUBCUTANEOUS WEEKLY
Qty: 2 ML | Refills: 0 | Status: SHIPPED | OUTPATIENT
Start: 2025-06-13 | End: 2025-07-11

## 2025-06-13 NOTE — TELEPHONE ENCOUNTER
PA for Zepbound) 2.5 mg/0.5 mL auto-injector  APPROVED     Date(s) approved 06/13/2025 to 02/13/2026      Patient advised by          []Almondyhart Message  []Phone call   [x]LMOM  []L/M to call office as no active Communication consent on file  []Unable to leave detailed message as VM not approved on Communication consent       Pharmacy advised by    [x]Fax  []Phone call  []Secure Chat    Specialty Pharmacy    []     Approval letter scanned into Media Yes

## 2025-06-13 NOTE — TELEPHONE ENCOUNTER
PA for Zepbound) 2.5 mg/0.5 mL auto-injector SUBMITTED to     via    []CMM-KEY:   []Surescripts-Case ID #   []Availity-Auth ID # NDC #   []Faxed to plan   [x]Other website [employer] - ID [id number]  []Phone call Case ID #     [x]PA sent as URGENT    All office notes, labs and other pertaining documents and studies sent. Clinical questions answered. Awaiting determination from insurance company.     Turnaround time for your insurance to make a decision on your Prior Authorization can take 7-21 business days.

## 2025-06-22 ENCOUNTER — OFFICE VISIT (OUTPATIENT)
Dept: URGENT CARE | Age: 55
End: 2025-06-22
Payer: COMMERCIAL

## 2025-06-22 VITALS
TEMPERATURE: 97 F | RESPIRATION RATE: 18 BRPM | WEIGHT: 158 LBS | HEIGHT: 60 IN | OXYGEN SATURATION: 99 % | HEART RATE: 78 BPM | SYSTOLIC BLOOD PRESSURE: 110 MMHG | BODY MASS INDEX: 31.02 KG/M2 | DIASTOLIC BLOOD PRESSURE: 78 MMHG

## 2025-06-22 DIAGNOSIS — G44.311 INTRACTABLE ACUTE POST-TRAUMATIC HEADACHE: Primary | ICD-10-CM

## 2025-06-22 DIAGNOSIS — M54.2 NECK PAIN: ICD-10-CM

## 2025-06-22 DIAGNOSIS — R11.0 NAUSEA: ICD-10-CM

## 2025-06-22 PROCEDURE — G0382 LEV 3 HOSP TYPE B ED VISIT: HCPCS | Performed by: PHYSICIAN ASSISTANT

## 2025-06-22 PROCEDURE — S9083 URGENT CARE CENTER GLOBAL: HCPCS | Performed by: PHYSICIAN ASSISTANT

## 2025-06-22 RX ORDER — ONDANSETRON 4 MG/1
4 TABLET, FILM COATED ORAL EVERY 8 HOURS PRN
Qty: 20 TABLET | Refills: 0 | Status: SHIPPED | OUTPATIENT
Start: 2025-06-22
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
## 2025-06-22 NOTE — PATIENT INSTRUCTIONS
Patient was educated on  possible concussion. Patient was told do to lightheadedness, falling a sleep after injury and nausea I recommend going to ED for further evaluation. At this time patient declined.    Concussion clinic referral placed.    Zofran sent for nausea.    Educated any worsening of headache, lightheadedness go to ED .

## 2025-07-05 ENCOUNTER — APPOINTMENT (OUTPATIENT)
Dept: LAB | Age: 55
End: 2025-07-05
Payer: COMMERCIAL

## 2025-07-05 DIAGNOSIS — Z00.6 ENCOUNTER FOR EXAMINATION FOR NORMAL COMPARISON OR CONTROL IN CLINICAL RESEARCH PROGRAM: ICD-10-CM

## 2025-07-05 DIAGNOSIS — Z00.00 HEALTHCARE MAINTENANCE: ICD-10-CM

## 2025-07-05 DIAGNOSIS — E78.49 OTHER HYPERLIPIDEMIA: ICD-10-CM

## 2025-07-05 LAB
ALBUMIN SERPL BCG-MCNC: 4.3 G/DL (ref 3.5–5)
ALP SERPL-CCNC: 70 U/L (ref 34–104)
ALT SERPL W P-5'-P-CCNC: 13 U/L (ref 7–52)
ANION GAP SERPL CALCULATED.3IONS-SCNC: 9 MMOL/L (ref 4–13)
AST SERPL W P-5'-P-CCNC: 17 U/L (ref 13–39)
BASOPHILS # BLD AUTO: 0.06 THOUSANDS/ÂΜL (ref 0–0.1)
BASOPHILS NFR BLD AUTO: 1 % (ref 0–1)
BILIRUB SERPL-MCNC: 0.66 MG/DL (ref 0.2–1)
BUN SERPL-MCNC: 16 MG/DL (ref 5–25)
CALCIUM SERPL-MCNC: 9.2 MG/DL (ref 8.4–10.2)
CHLORIDE SERPL-SCNC: 105 MMOL/L (ref 96–108)
CHOLEST SERPL-MCNC: 135 MG/DL (ref ?–200)
CO2 SERPL-SCNC: 27 MMOL/L (ref 21–32)
CREAT SERPL-MCNC: 0.7 MG/DL (ref 0.6–1.3)
EOSINOPHIL # BLD AUTO: 0.19 THOUSAND/ÂΜL (ref 0–0.61)
EOSINOPHIL NFR BLD AUTO: 4 % (ref 0–6)
ERYTHROCYTE [DISTWIDTH] IN BLOOD BY AUTOMATED COUNT: 12.4 % (ref 11.6–15.1)
GFR SERPL CREATININE-BSD FRML MDRD: 97 ML/MIN/1.73SQ M
GLUCOSE P FAST SERPL-MCNC: 65 MG/DL (ref 65–99)
HCT VFR BLD AUTO: 40.5 % (ref 34.8–46.1)
HDLC SERPL-MCNC: 60 MG/DL
HGB BLD-MCNC: 13 G/DL (ref 11.5–15.4)
IMM GRANULOCYTES # BLD AUTO: 0.03 THOUSAND/UL (ref 0–0.2)
IMM GRANULOCYTES NFR BLD AUTO: 1 % (ref 0–2)
LDLC SERPL CALC-MCNC: 63 MG/DL (ref 0–100)
LYMPHOCYTES # BLD AUTO: 1.47 THOUSANDS/ÂΜL (ref 0.6–4.47)
LYMPHOCYTES NFR BLD AUTO: 27 % (ref 14–44)
MCH RBC QN AUTO: 30.6 PG (ref 26.8–34.3)
MCHC RBC AUTO-ENTMCNC: 32.1 G/DL (ref 31.4–37.4)
MCV RBC AUTO: 95 FL (ref 82–98)
MONOCYTES # BLD AUTO: 0.42 THOUSAND/ÂΜL (ref 0.17–1.22)
MONOCYTES NFR BLD AUTO: 8 % (ref 4–12)
NEUTROPHILS # BLD AUTO: 3.29 THOUSANDS/ÂΜL (ref 1.85–7.62)
NEUTS SEG NFR BLD AUTO: 59 % (ref 43–75)
NRBC BLD AUTO-RTO: 0 /100 WBCS
PLATELET # BLD AUTO: 380 THOUSANDS/UL (ref 149–390)
PMV BLD AUTO: 10.7 FL (ref 8.9–12.7)
POTASSIUM SERPL-SCNC: 4.3 MMOL/L (ref 3.5–5.3)
PROT SERPL-MCNC: 6.6 G/DL (ref 6.4–8.4)
RBC # BLD AUTO: 4.25 MILLION/UL (ref 3.81–5.12)
SODIUM SERPL-SCNC: 141 MMOL/L (ref 135–147)
TRIGL SERPL-MCNC: 60 MG/DL (ref ?–150)
TSH SERPL DL<=0.05 MIU/L-ACNC: 0.86 UIU/ML (ref 0.45–4.5)
WBC # BLD AUTO: 5.46 THOUSAND/UL (ref 4.31–10.16)

## 2025-07-05 PROCEDURE — 36415 COLL VENOUS BLD VENIPUNCTURE: CPT

## 2025-07-05 PROCEDURE — 83036 HEMOGLOBIN GLYCOSYLATED A1C: CPT

## 2025-07-05 PROCEDURE — 80053 COMPREHEN METABOLIC PANEL: CPT

## 2025-07-05 PROCEDURE — 85025 COMPLETE CBC W/AUTO DIFF WBC: CPT

## 2025-07-05 PROCEDURE — 84443 ASSAY THYROID STIM HORMONE: CPT

## 2025-07-05 PROCEDURE — 80061 LIPID PANEL: CPT

## 2025-07-06 LAB
EST. AVERAGE GLUCOSE BLD GHB EST-MCNC: 68 MG/DL
HBA1C MFR BLD: 4 %

## 2025-07-08 ENCOUNTER — RESULTS FOLLOW-UP (OUTPATIENT)
Dept: FAMILY MEDICINE CLINIC | Facility: CLINIC | Age: 55
End: 2025-07-08

## 2025-07-11 ENCOUNTER — OFFICE VISIT (OUTPATIENT)
Dept: FAMILY MEDICINE CLINIC | Facility: CLINIC | Age: 55
End: 2025-07-11
Payer: COMMERCIAL

## 2025-07-11 VITALS
SYSTOLIC BLOOD PRESSURE: 116 MMHG | WEIGHT: 153 LBS | BODY MASS INDEX: 30.04 KG/M2 | OXYGEN SATURATION: 97 % | DIASTOLIC BLOOD PRESSURE: 70 MMHG | HEART RATE: 85 BPM | RESPIRATION RATE: 16 BRPM | HEIGHT: 60 IN | TEMPERATURE: 97.4 F

## 2025-07-11 DIAGNOSIS — E78.49 OTHER HYPERLIPIDEMIA: ICD-10-CM

## 2025-07-11 DIAGNOSIS — F51.01 PRIMARY INSOMNIA: ICD-10-CM

## 2025-07-11 DIAGNOSIS — R11.0 NAUSEA: ICD-10-CM

## 2025-07-11 DIAGNOSIS — K21.9 GASTROESOPHAGEAL REFLUX DISEASE WITHOUT ESOPHAGITIS: ICD-10-CM

## 2025-07-11 PROCEDURE — 99214 OFFICE O/P EST MOD 30 MIN: CPT | Performed by: FAMILY MEDICINE

## 2025-07-11 RX ORDER — TIRZEPATIDE 5 MG/.5ML
5 INJECTION, SOLUTION SUBCUTANEOUS WEEKLY
Qty: 2 ML | Refills: 3 | Status: SHIPPED | OUTPATIENT
Start: 2025-07-11

## 2025-07-11 RX ORDER — ONDANSETRON 4 MG/1
4 TABLET, FILM COATED ORAL EVERY 8 HOURS PRN
Qty: 20 TABLET | Refills: 0 | Status: SHIPPED | OUTPATIENT
Start: 2025-07-11

## 2025-07-11 RX ORDER — HYDROXYZINE HYDROCHLORIDE 25 MG/1
25 TABLET, FILM COATED ORAL
Qty: 30 TABLET | Refills: 3 | Status: SHIPPED | OUTPATIENT
Start: 2025-07-11

## 2025-07-11 NOTE — ASSESSMENT & PLAN NOTE
Tolerating medication well. Will increase Zepbound to 5.0 mg. Continue with low-carb diet and regular exercise. Return in 3 months for follow up.   Orders:  •  tirzepatide (Zepbound) 5 mg/0.5 mL auto-injector; Inject 0.5 mL (5 mg total) under the skin once a week

## 2025-07-11 NOTE — PROGRESS NOTES
Name: Kavitha Marx      : 1970      MRN: [id number]  Encounter Provider: Dharmesh Humphries MD  Encounter Date: 2025   Encounter department:  [facility]  :  Assessment & Plan  BMI 29.0-29.9,adult  Tolerating medication well. Will increase Zepbound to 5.0 mg. Continue with low-carb diet and regular exercise. Return in 3 months for follow up.   Orders:  •  tirzepatide (Zepbound) 5 mg/0.5 mL auto-injector; Inject 0.5 mL (5 mg total) under the skin once a week    Primary insomnia  Patient having difficulty sleeping. Prescribed Atarax 25 mg. Discussed medication side effects. Patient can contact office if still unable to sleep for alternative sleep aid such as Ativan.   Orders:  •  hydrOXYzine HCL (ATARAX) 25 mg tablet; Take 1 tablet (25 mg total) by mouth daily at bedtime    Nausea  Mild nausea with Zepbound. Prescribed Zofran PRN nausea.    Orders:  •  ondansetron (ZOFRAN) 4 mg tablet; Take 1 tablet (4 mg total) by mouth every 8 (eight) hours as needed for nausea or vomiting    Gastroesophageal reflux disease without esophagitis  Well controlled. Continue with Prilosec 40 mg. Will continue to monitor.        Other hyperlipidemia  Last lipid panel WNL. Patient controlled without medication. Continue with dietary modification and exercise. Continue to monitor.               History of Present Illness   Kavitha is a 54 year old female with PMH of GERD, asthma, and elevated BMI presenting for 6 month follow up. Patient has recently changed from Ozempic to Zepbound 2.5 mg and is tolerating the medication much better. She has very little nausea and denies any other side effects. Recently patient has been having personal issues preventing her from sleeping. Taking all other medication as prescribed. Denies any medication side effects.       Review of Systems   Constitutional:  Positive for fatigue. Negative for chills and fever.   Respiratory:  Negative for cough and shortness of breath.     Cardiovascular:  Negative for chest pain and leg swelling.   Gastrointestinal:  Positive for nausea. Negative for abdominal pain, constipation, diarrhea and vomiting.   Psychiatric/Behavioral:  Positive for sleep disturbance.    All other systems reviewed and are negative.      Objective   /70 (BP Location: Left arm, Patient Position: Sitting, Cuff Size: Adult)   Pulse 85   Temp (!) 97.4 °F (36.3 °C) (Tympanic)   Resp 16   Ht 5' (1.524 m)   Wt 69.4 kg (153 lb)   SpO2 97%   BMI 29.88 kg/m²      Physical Exam  Vitals reviewed.   Constitutional:       Appearance: Normal appearance.   HENT:      Head: Normocephalic and atraumatic.      Nose: Nose normal.     Eyes:      Extraocular Movements: Extraocular movements intact.      Conjunctiva/sclera: Conjunctivae normal.       Cardiovascular:      Rate and Rhythm: Normal rate and regular rhythm.      Pulses: Normal pulses.      Heart sounds: Normal heart sounds.   Pulmonary:      Effort: Pulmonary effort is normal.      Breath sounds: Normal breath sounds.     Neurological:      Mental Status: She is alert and oriented to person, place, and time.     Psychiatric:         Mood and Affect: Mood normal.         Behavior: Behavior normal.

## 2025-07-11 NOTE — ASSESSMENT & PLAN NOTE
Mild nausea with Zepbound. Prescribed Zofran PRN nausea.    Orders:  •  ondansetron (ZOFRAN) 4 mg tablet; Take 1 tablet (4 mg total) by mouth every 8 (eight) hours as needed for nausea or vomiting

## 2025-07-11 NOTE — ASSESSMENT & PLAN NOTE
Last lipid panel WNL. Patient controlled without medication. Continue with dietary modification and exercise. Continue to monitor.

## 2025-07-11 NOTE — ASSESSMENT & PLAN NOTE
Patient having difficulty sleeping. Prescribed Atarax 25 mg. Discussed medication side effects. Patient can contact office if still unable to sleep for alternative sleep aid such as Ativan.   Orders:  •  hydrOXYzine HCL (ATARAX) 25 mg tablet; Take 1 tablet (25 mg total) by mouth daily at bedtime

## 2025-07-15 LAB
APOB+LDLR+PCSK9 GENE MUT ANL BLD/T: NOT DETECTED
BRCA1+BRCA2 DEL+DUP + FULL MUT ANL BLD/T: NOT DETECTED
MLH1+MSH2+MSH6+PMS2 GN DEL+DUP+FUL M: NOT DETECTED

## 2025-07-18 ENCOUNTER — TELEPHONE (OUTPATIENT)
Age: 55
End: 2025-07-18

## 2025-07-18 NOTE — TELEPHONE ENCOUNTER
Patient called in and stated that the   albuterol (PROVENTIL HFA,VENTOLIN HFA) 90 mcg/act inhale was not sent to [facility].  She is asking to see if the Primary Care Provider can send in to [facility]?        Also can we send a script for the dissolving tablets for the zofran?  They tend not to make her sleepy and can take them at work.      Please advise

## 2025-07-18 NOTE — TELEPHONE ENCOUNTER
Patient called in and stated that the   albuterol (PROVENTIL HFA,VENTOLIN HFA) 90 mcg/act inhale was not sent to [facility].  She is asking to see if the Primary Care Provider can send in to [facility]?      Also can we send a script for the dissolving tablets for the zofran?  They tend not to make her sleepy and can take them at work.  Pls advise if we can not honor requests. [phone]

## 2025-07-21 DIAGNOSIS — R11.0 NAUSEA: Primary | ICD-10-CM

## 2025-07-21 DIAGNOSIS — J45.20 MILD INTERMITTENT ASTHMA WITHOUT COMPLICATION: ICD-10-CM

## 2025-07-21 RX ORDER — ONDANSETRON 4 MG/1
4 TABLET, ORALLY DISINTEGRATING ORAL EVERY 6 HOURS PRN
Qty: 20 TABLET | Refills: 2 | Status: SHIPPED | OUTPATIENT
Start: 2025-07-21

## 2025-07-21 RX ORDER — ALBUTEROL SULFATE 90 UG/1
2 INHALANT RESPIRATORY (INHALATION) EVERY 6 HOURS PRN
Qty: 18 G | Refills: 3 | Status: SHIPPED | OUTPATIENT
Start: 2025-07-21

## 2025-08-04 DIAGNOSIS — F51.01 PRIMARY INSOMNIA: ICD-10-CM

## 2025-08-06 RX ORDER — HYDROXYZINE HYDROCHLORIDE 25 MG/1
25 TABLET, FILM COATED ORAL
Qty: 90 TABLET | Refills: 2 | OUTPATIENT
Start: 2025-08-06